# Patient Record
Sex: FEMALE | Race: WHITE | NOT HISPANIC OR LATINO | Employment: OTHER | URBAN - METROPOLITAN AREA
[De-identification: names, ages, dates, MRNs, and addresses within clinical notes are randomized per-mention and may not be internally consistent; named-entity substitution may affect disease eponyms.]

---

## 2022-04-13 ENCOUNTER — TRANSFERRED RECORDS (OUTPATIENT)
Dept: HEALTH INFORMATION MANAGEMENT | Facility: CLINIC | Age: 72
End: 2022-04-13

## 2022-05-24 ENCOUNTER — TRANSFERRED RECORDS (OUTPATIENT)
Dept: HEALTH INFORMATION MANAGEMENT | Facility: CLINIC | Age: 72
End: 2022-05-24

## 2022-06-17 ENCOUNTER — MEDICAL CORRESPONDENCE (OUTPATIENT)
Dept: HEALTH INFORMATION MANAGEMENT | Facility: CLINIC | Age: 72
End: 2022-06-17

## 2022-06-25 ENCOUNTER — TRANSCRIBE ORDERS (OUTPATIENT)
Dept: OTHER | Age: 72
End: 2022-06-25

## 2022-06-25 ENCOUNTER — TELEPHONE (OUTPATIENT)
Dept: ORTHOPEDICS | Facility: CLINIC | Age: 72
End: 2022-06-25

## 2022-06-25 DIAGNOSIS — M85.662 BONE CYST OF LEFT TIBIA: Primary | ICD-10-CM

## 2022-06-25 NOTE — TELEPHONE ENCOUNTER
[facility] Call Center    Phone Message    May a detailed message be left on voicemail: yes     Reason for Call: Appointment Intake    Referring Provider Name: Dr Tee Espinoza    Diagnosis and/or Symptoms: Consult for biopsy of left tibial cyst seen on x-rays/Ct-Scan    Action Taken: Message routed to:  [facility] Orthopedics - Dr. Tee Espinoza     Travel Screening: Not Applicable

## 2022-06-27 NOTE — TELEPHONE ENCOUNTER
Called and LVM to have pt call back.     OK to schedul into [facility] with last open spot ( RTN spot) Virtual or [facility] any time next week      Carline

## 2022-06-30 NOTE — TELEPHONE ENCOUNTER
DIAGNOSIS: consult for biopsy of left tibial cyst / X-Rays, CT-Scan / Deng Yang PA-C   APPOINTMENT DATE: 7.8.22   NOTES STATUS DETAILS   OFFICE NOTE from referring provider Received: 07/06/2022 04/13/2022 - Deng Yang PA-C @ [facility]     LABS     CBC/DIFF Care Everywhere 02/23/2022   XRAY PACS 04/13/2022 - LT Knee   CT SCAN PACS 05/24/2022 - Left Knee     Action 6.30.22 4:46 PM SHOBHA   Action Taken Faxed request to [facility] for records [phone]      Action July 6, 2022 3:43 PM MT   Action Taken CSS recvd records from [facility] and sent to urgent scan for upload. CSS recvd and resolved imgs to PACS.

## 2022-07-08 ENCOUNTER — PRE VISIT (OUTPATIENT)
Dept: ORTHOPEDICS | Facility: CLINIC | Age: 72
End: 2022-07-08

## 2022-07-08 ENCOUNTER — OFFICE VISIT (OUTPATIENT)
Dept: ORTHOPEDICS | Facility: CLINIC | Age: 72
End: 2022-07-08
Payer: COMMERCIAL

## 2022-07-08 ENCOUNTER — LAB (OUTPATIENT)
Dept: LAB | Facility: CLINIC | Age: 72
End: 2022-07-08

## 2022-07-08 VITALS — HEIGHT: 67 IN | WEIGHT: 175 LBS | BODY MASS INDEX: 27.47 KG/M2

## 2022-07-08 DIAGNOSIS — M85.662 BONE CYST OF LEFT TIBIA: ICD-10-CM

## 2022-07-08 LAB — TOTAL PROTEIN SERUM FOR ELP: 7.5 G/DL (ref 6.4–8.3)

## 2022-07-08 PROCEDURE — 84165 PROTEIN E-PHORESIS SERUM: CPT | Mod: TC | Performed by: PATHOLOGY

## 2022-07-08 PROCEDURE — 99204 OFFICE O/P NEW MOD 45 MIN: CPT | Performed by: ORTHOPAEDIC SURGERY

## 2022-07-08 PROCEDURE — 84155 ASSAY OF PROTEIN SERUM: CPT | Performed by: ORTHOPAEDIC SURGERY

## 2022-07-08 PROCEDURE — 83521 IG LIGHT CHAINS FREE EACH: CPT | Performed by: ORTHOPAEDIC SURGERY

## 2022-07-08 PROCEDURE — 84165 PROTEIN E-PHORESIS SERUM: CPT | Mod: 26

## 2022-07-08 PROCEDURE — 36415 COLL VENOUS BLD VENIPUNCTURE: CPT | Performed by: PATHOLOGY

## 2022-07-08 RX ORDER — LORAZEPAM 0.5 MG/1
TABLET ORAL
COMMUNITY
Start: 2022-06-12

## 2022-07-08 RX ORDER — ESTRADIOL 10 UG/1
10 INSERT VAGINAL
COMMUNITY
Start: 2021-06-22

## 2022-07-08 RX ORDER — AMLODIPINE BESYLATE 5 MG/1
1 TABLET ORAL 2 TIMES DAILY
COMMUNITY
Start: 2021-08-30

## 2022-07-08 RX ORDER — ALBUTEROL SULFATE 90 UG/1
AEROSOL, METERED RESPIRATORY (INHALATION)
COMMUNITY
Start: 2021-12-09

## 2022-07-08 RX ORDER — PREGABALIN 100 MG/1
200 CAPSULE ORAL DAILY
COMMUNITY
Start: 2022-06-17

## 2022-07-08 RX ORDER — NORTRIPTYLINE HCL 10 MG
CAPSULE ORAL
COMMUNITY
Start: 2022-04-14

## 2022-07-08 RX ORDER — LANSOPRAZOLE 30 MG/1
CAPSULE, DELAYED RELEASE ORAL
COMMUNITY
Start: 2022-06-03

## 2022-07-08 RX ORDER — FAMOTIDINE 20 MG
TABLET ORAL
Status: ON HOLD | COMMUNITY
End: 2023-03-27

## 2022-07-08 RX ORDER — SODIUM PHOSPHATE,MONO-DIBASIC 19G-7G/118
ENEMA (ML) RECTAL DAILY
COMMUNITY

## 2022-07-08 RX ORDER — ASPIRIN 81 MG/1
81 TABLET ORAL DAILY
Status: ON HOLD | COMMUNITY
End: 2023-03-28

## 2022-07-08 RX ORDER — DICYCLOMINE HYDROCHLORIDE 10 MG/1
10 CAPSULE ORAL
Status: ON HOLD | COMMUNITY
Start: 2022-06-30 | End: 2023-03-27

## 2022-07-08 RX ORDER — ONDANSETRON 4 MG/1
4 TABLET, ORALLY DISINTEGRATING ORAL PRN
COMMUNITY
Start: 2022-06-30

## 2022-07-08 RX ORDER — CHLORAL HYDRATE 500 MG
1 CAPSULE ORAL
COMMUNITY

## 2022-07-08 RX ORDER — ZOLPIDEM TARTRATE 10 MG/1
TABLET ORAL
Status: ON HOLD | COMMUNITY
Start: 2022-06-06 | End: 2024-08-19

## 2022-07-08 NOTE — LETTER
7/8/2022         RE: Delmis Parrish  [address] [facility] [address]        Dear Colleague,    Thank you for referring your patient, Delmis Parrish, to the [facility]. Please see a copy of my visit note below.    This patient's noticed some left knee pain which has been somewhat responsive to steroid injections.  She had an injection last December and then 1 3 months ago in April.  She stated that the most recent injection gave her relief of most of her pain for about 3 months.      As part of her evaluation for knee pain she had x-ray and then eventually a CT scan.  She is unable to get MRI because of a spine stimulator.  The scan showed a large lytic lesion in the tibial plateau area.  There is no obvious extension to the joint and no surrounding sclerosis.  We would consider metastases and myeloma although she has knee osteoarthritis and a cyst related to the arthritis is a possibility.  On those cyst we will often see some surrounding sclerosis after they have been established.  And there is also often joint connection which is not obvious with this patient.    On examination the patient is alert oriented has normal mood and affect and is in no acute distress.  She has some joint line tenderness and crepitance in her knee with a mild effusion.  She is able to extend it fully and flex past 90 degrees.  There is no erythema or adenopathy in the left lower extremity.    I have proposed open biopsy to this patient.  She is in agreement with this plan.  We will do this through interventional radiology.  An open biopsy will be performed if no diagnosis is obtained.  I have answered all the patient's questions.    Sincerely,        Tee Espinoza MD

## 2022-07-08 NOTE — PROGRESS NOTES
This patient's noticed some left knee pain which has been somewhat responsive to steroid injections.  She had an injection last December and then 1 3 months ago in April.  She stated that the most recent injection gave her relief of most of her pain for about 3 months.      As part of her evaluation for knee pain she had x-ray and then eventually a CT scan.  She is unable to get MRI because of a spine stimulator.  The scan showed a large lytic lesion in the tibial plateau area.  There is no obvious extension to the joint and no surrounding sclerosis.  We would consider metastases and myeloma although she has knee osteoarthritis and a cyst related to the arthritis is a possibility.  On those cyst we will often see some surrounding sclerosis after they have been established.  And there is also often joint connection which is not obvious with this patient.    On examination the patient is alert oriented has normal mood and affect and is in no acute distress.  She has some joint line tenderness and crepitance in her knee with a mild effusion.  She is able to extend it fully and flex past 90 degrees.  There is no erythema or adenopathy in the left lower extremity.    I have proposed open biopsy to this patient.  She is in agreement with this plan.  We will do this through interventional radiology.  An open biopsy will be performed if no diagnosis is obtained.  I have answered all the patient's questions.

## 2022-07-08 NOTE — NURSING NOTE
"Reason For Visit:   Chief Complaint   Patient presents with     Consult     Left knee cyst referred by Deng Yang PA-C at [facility] // noticed first pain in December 2021       Ht 1.71 m (5' 7.32\")   Wt 79.4 kg (175 lb)   BMI 27.15 kg/m      Pain Assessment  Patient Currently in Pain: Yes  0-10 Pain Scale: 4  Primary Pain Location: Knee (left knee)      Rafael Rodriguez ATC    "

## 2022-07-11 ENCOUNTER — TELEPHONE (OUTPATIENT)
Dept: INTERVENTIONAL RADIOLOGY/VASCULAR | Facility: CLINIC | Age: 72
End: 2022-07-11

## 2022-07-11 DIAGNOSIS — M25.561 RIGHT KNEE PAIN, UNSPECIFIED CHRONICITY: Primary | ICD-10-CM

## 2022-07-11 DIAGNOSIS — M25.562 LEFT KNEE PAIN, UNSPECIFIED CHRONICITY: ICD-10-CM

## 2022-07-11 LAB
KAPPA LC FREE SER-MCNC: 2.35 MG/DL (ref 0.33–1.94)
KAPPA LC FREE/LAMBDA FREE SER NEPH: 0.87 {RATIO} (ref 0.26–1.65)
LAMBDA LC FREE SERPL-MCNC: 2.7 MG/DL (ref 0.57–2.63)

## 2022-07-11 NOTE — PROGRESS NOTES
Outpatient IR Biopsy Referral    Patient is a 70 y/o female with a PMH of GERD, ETOH abuse in remission, spinal cord stimulator, depression, LVH, lumbar spondylosis, diverticulosis, plantar facial fibromatosis, HTN  left knee pain s/p steroid injections with some relief, last injection April 2022?. Imaging CT 5/24/22 notes a 3.6 x 3.0 x 2.2 cm lucent lesion within the lateral aspect of the proximal tibial metaphysis.  IR has been asked to biopsy a large tibial plateau lesion for concerns of myeloma.        Case and imaging CT 5/24/22 was reviewed with Dr. Lopez from IR and CT guided biopsy of LEFT proximal tibia lesion  is approved. Dr. Lopez recommends a lateral approach Series 4  Image 119.    Procedure order, surgical pathology and flow orders placed.    ASA should be held for 5 days prior to scheduled procedure.     If requesting team would like samples sent for anything else please enter them or notify IR prior to scheduled procedure.    Primary team Dr. Alexis Rodarte made aware of IR recommendations via epic messaging.     KRYSTAL Jensen CNP  Interventional Radiology   IR on-call pager: [phone]

## 2022-07-12 ENCOUNTER — MYC MEDICAL ADVICE (OUTPATIENT)
Dept: INTERVENTIONAL RADIOLOGY/VASCULAR | Facility: CLINIC | Age: 72
End: 2022-07-12

## 2022-07-12 ENCOUNTER — TELEPHONE (OUTPATIENT)
Dept: INTERVENTIONAL RADIOLOGY/VASCULAR | Facility: CLINIC | Age: 72
End: 2022-07-12

## 2022-07-12 LAB
ALBUMIN SERPL ELPH-MCNC: 4.6 G/DL (ref 3.7–5.1)
ALPHA1 GLOB SERPL ELPH-MCNC: 0.3 G/DL (ref 0.2–0.4)
ALPHA2 GLOB SERPL ELPH-MCNC: 0.6 G/DL (ref 0.5–0.9)
B-GLOBULIN SERPL ELPH-MCNC: 0.9 G/DL (ref 0.6–1)
GAMMA GLOB SERPL ELPH-MCNC: 1.1 G/DL (ref 0.7–1.6)
M PROTEIN SERPL ELPH-MCNC: 0.2 G/DL
PROT PATTERN SERPL ELPH-IMP: ABNORMAL

## 2022-07-15 ENCOUNTER — HOSPITAL ENCOUNTER (OUTPATIENT)
Facility: CLINIC | Age: 72
Discharge: HOME | End: 2022-07-15
Attending: ORTHOPAEDIC SURGERY | Admitting: RADIOLOGY
Payer: COMMERCIAL

## 2022-07-15 ENCOUNTER — APPOINTMENT (OUTPATIENT)
Dept: MEDSURG UNIT | Facility: CLINIC | Age: 72
End: 2022-07-15
Attending: ORTHOPAEDIC SURGERY
Payer: COMMERCIAL

## 2022-07-15 ENCOUNTER — APPOINTMENT (OUTPATIENT)
Dept: INTERVENTIONAL RADIOLOGY/VASCULAR | Facility: CLINIC | Age: 72
End: 2022-07-15
Attending: ORTHOPAEDIC SURGERY
Payer: COMMERCIAL

## 2022-07-15 VITALS
RESPIRATION RATE: 12 BRPM | OXYGEN SATURATION: 88 % | HEART RATE: 67 BPM | BODY MASS INDEX: 26.07 KG/M2 | HEIGHT: 68 IN | DIASTOLIC BLOOD PRESSURE: 73 MMHG | WEIGHT: 172 LBS | SYSTOLIC BLOOD PRESSURE: 105 MMHG | TEMPERATURE: 98 F

## 2022-07-15 DIAGNOSIS — M25.562 LEFT KNEE PAIN, UNSPECIFIED CHRONICITY: ICD-10-CM

## 2022-07-15 DIAGNOSIS — M25.561 RIGHT KNEE PAIN, UNSPECIFIED CHRONICITY: ICD-10-CM

## 2022-07-15 LAB
ERYTHROCYTE [DISTWIDTH] IN BLOOD BY AUTOMATED COUNT: 12.9 % (ref 10–15)
HCT VFR BLD AUTO: 42.8 % (ref 35–47)
HGB BLD-MCNC: 14.1 G/DL (ref 11.7–15.7)
INR PPP: 1.01 (ref 0.85–1.15)
MCH RBC QN AUTO: 29.7 PG (ref 26.5–33)
MCHC RBC AUTO-ENTMCNC: 32.9 G/DL (ref 31.5–36.5)
MCV RBC AUTO: 90 FL (ref 78–100)
PLATELET # BLD AUTO: 267 10E3/UL (ref 150–450)
RBC # BLD AUTO: 4.74 10E6/UL (ref 3.8–5.2)
WBC # BLD AUTO: 7.5 10E3/UL (ref 4–11)

## 2022-07-15 PROCEDURE — 999N000054 HC STATISTIC EKG NON-CHARGEABLE

## 2022-07-15 PROCEDURE — 77012 CT SCAN FOR NEEDLE BIOPSY: CPT | Mod: 26 | Performed by: RADIOLOGY

## 2022-07-15 PROCEDURE — 20225 BONE BIOPSY TROCAR/NDL DEEP: CPT

## 2022-07-15 PROCEDURE — 99152 MOD SED SAME PHYS/QHP 5/>YRS: CPT

## 2022-07-15 PROCEDURE — 88185 FLOWCYTOMETRY/TC ADD-ON: CPT | Performed by: ORTHOPAEDIC SURGERY

## 2022-07-15 PROCEDURE — 93010 ELECTROCARDIOGRAM REPORT: CPT | Mod: 59 | Performed by: INTERNAL MEDICINE

## 2022-07-15 PROCEDURE — 250N000011 HC RX IP 250 OP 636: Performed by: STUDENT IN AN ORGANIZED HEALTH CARE EDUCATION/TRAINING PROGRAM

## 2022-07-15 PROCEDURE — 85610 PROTHROMBIN TIME: CPT | Performed by: NURSE PRACTITIONER

## 2022-07-15 PROCEDURE — 272N000509 HC NEEDLE CR9

## 2022-07-15 PROCEDURE — 99152 MOD SED SAME PHYS/QHP 5/>YRS: CPT | Performed by: RADIOLOGY

## 2022-07-15 PROCEDURE — 999N000142 HC STATISTIC PROCEDURE PREP ONLY

## 2022-07-15 PROCEDURE — 36415 COLL VENOUS BLD VENIPUNCTURE: CPT | Performed by: NURSE PRACTITIONER

## 2022-07-15 PROCEDURE — 85027 COMPLETE CBC AUTOMATED: CPT | Performed by: NURSE PRACTITIONER

## 2022-07-15 PROCEDURE — 272N000505 HC NEEDLE CR5

## 2022-07-15 PROCEDURE — 250N000009 HC RX 250: Performed by: STUDENT IN AN ORGANIZED HEALTH CARE EDUCATION/TRAINING PROGRAM

## 2022-07-15 PROCEDURE — 20225 BONE BIOPSY TROCAR/NDL DEEP: CPT | Performed by: RADIOLOGY

## 2022-07-15 PROCEDURE — 93005 ELECTROCARDIOGRAM TRACING: CPT

## 2022-07-15 PROCEDURE — 272N000155 HC KIT CR15

## 2022-07-15 PROCEDURE — 88311 DECALCIFY TISSUE: CPT | Mod: TC | Performed by: ORTHOPAEDIC SURGERY

## 2022-07-15 PROCEDURE — 999N000133 HC STATISTIC PP CARE STAGE 2

## 2022-07-15 PROCEDURE — 88188 FLOWCYTOMETRY/READ 9-15: CPT | Performed by: STUDENT IN AN ORGANIZED HEALTH CARE EDUCATION/TRAINING PROGRAM

## 2022-07-15 RX ORDER — NALOXONE HYDROCHLORIDE 0.4 MG/ML
0.4 INJECTION, SOLUTION INTRAMUSCULAR; INTRAVENOUS; SUBCUTANEOUS
Status: DISCONTINUED | OUTPATIENT
Start: 2022-07-15 | End: 2022-07-15 | Stop reason: HOSPADM

## 2022-07-15 RX ORDER — NALOXONE HYDROCHLORIDE 0.4 MG/ML
0.2 INJECTION, SOLUTION INTRAMUSCULAR; INTRAVENOUS; SUBCUTANEOUS
Status: DISCONTINUED | OUTPATIENT
Start: 2022-07-15 | End: 2022-07-15 | Stop reason: HOSPADM

## 2022-07-15 RX ORDER — FENTANYL CITRATE 50 UG/ML
25-50 INJECTION, SOLUTION INTRAMUSCULAR; INTRAVENOUS EVERY 5 MIN PRN
Status: DISCONTINUED | OUTPATIENT
Start: 2022-07-15 | End: 2022-07-15 | Stop reason: HOSPADM

## 2022-07-15 RX ORDER — FLUMAZENIL 0.1 MG/ML
0.2 INJECTION, SOLUTION INTRAVENOUS
Status: DISCONTINUED | OUTPATIENT
Start: 2022-07-15 | End: 2022-07-15 | Stop reason: HOSPADM

## 2022-07-15 RX ORDER — SODIUM CHLORIDE 9 MG/ML
INJECTION, SOLUTION INTRAVENOUS CONTINUOUS
Status: DISCONTINUED | OUTPATIENT
Start: 2022-07-15 | End: 2022-07-15 | Stop reason: HOSPADM

## 2022-07-15 RX ORDER — LIDOCAINE 40 MG/G
CREAM TOPICAL
Status: DISCONTINUED | OUTPATIENT
Start: 2022-07-15 | End: 2022-07-15 | Stop reason: HOSPADM

## 2022-07-15 RX ADMIN — MIDAZOLAM HYDROCHLORIDE 0.5 MG: 1 INJECTION, SOLUTION INTRAMUSCULAR; INTRAVENOUS at 11:22

## 2022-07-15 RX ADMIN — FENTANYL CITRATE 25 MCG: 50 INJECTION, SOLUTION INTRAMUSCULAR; INTRAVENOUS at 11:13

## 2022-07-15 RX ADMIN — MIDAZOLAM HYDROCHLORIDE 0.5 MG: 1 INJECTION, SOLUTION INTRAMUSCULAR; INTRAVENOUS at 11:14

## 2022-07-15 RX ADMIN — FENTANYL CITRATE 25 MCG: 50 INJECTION, SOLUTION INTRAMUSCULAR; INTRAVENOUS at 11:18

## 2022-07-15 RX ADMIN — MIDAZOLAM HYDROCHLORIDE 0.5 MG: 1 INJECTION, SOLUTION INTRAMUSCULAR; INTRAVENOUS at 11:30

## 2022-07-15 RX ADMIN — LIDOCAINE HYDROCHLORIDE 6 ML: 10 INJECTION, SOLUTION EPIDURAL; INFILTRATION; INTRACAUDAL; PERINEURAL at 11:41

## 2022-07-15 RX ADMIN — FENTANYL CITRATE 25 MCG: 50 INJECTION, SOLUTION INTRAMUSCULAR; INTRAVENOUS at 11:21

## 2022-07-15 RX ADMIN — MIDAZOLAM HYDROCHLORIDE 0.5 MG: 1 INJECTION, SOLUTION INTRAMUSCULAR; INTRAVENOUS at 11:18

## 2022-07-15 RX ADMIN — FENTANYL CITRATE 25 MCG: 50 INJECTION, SOLUTION INTRAMUSCULAR; INTRAVENOUS at 11:30

## 2022-07-15 NOTE — PROGRESS NOTES
PIV removed. Patient tolerating PO intake, ambulated to bathroom, voiding spontaneously. LLE biopsy site with primcarmen, CDI Discharge paperwork reviewed with patient and , educated patient on activity restrictions and when to seek medical attention pt stated understanding.  Chris will transport patient home.

## 2022-07-15 NOTE — DISCHARGE INSTRUCTIONS
[facility]    Interventional Radiology  Patient Instructions Following Biopsy    AFTER YOU GO HOME  If you were given sedation DO NOT drive or operate machinery at home or at work for at least 24 hours  DO relax and take it easy for 48 hours, no strenuous activity for 24 hours  DO drink plenty of fluids  DO resume your regular diet, unless otherwise instructed by your Primary Physician  Keep the dressing dry and in place for 24 hours.  DO NOT SMOKE FOR AT LEAST 24 HOURS, if you have been given any medications that were to help you relax or sedate you during your procedure  DO NOT drink alcoholic beverages the day of your procedure  DO NOT do any strenuous exercise or lifting (> 10 lbs) for at least 7 days following your procedure  DO NOT take a bath or shower for at least 12 hours following your procedure  Remove dressing after shower the next day. Replace with Band aid for 2 days.  Never leave a wet dressing in place.  DO NOT make any important or legal decisions for 24 hours following your procedure  There should be minimum drainage from the biopsy site    CALL THE PHYSICIAN IF:  You start bleeding from the procedure site.  If you do start to bleed from that site, lie down flat and hold pressure on the site for a minimum of 10 minutes.  Your physician will tell you if you need to return to the hospital  You develop nausea or vomiting  You have excessive swelling, redness, or tenderness at the site  You have drainage that looks like it is infected.  You experience severe pain  You develop hives or a rash or unexplained itching  You develop shortness of breath  You develop a temperature of 101 degrees F or greater  You develop bloody clots or red urine after you are discharged  You develop chest pain or cough up blood, lightheadedness or fainting    ADDITIONAL INSTRUCTIONS  If you are taking Coumadin, restart tonight.  Follow up with your Coumadin Clinic or Primary Care MD to have your INR  rechecked.    [facility] INTERVENTIONAL RADIOLOGY DEPARTMENT  Procedure Physician:          Jose Daniel Esparaz  Date of procedure: July 15, 2022  Telephone Numbers: [phone] Monday-Friday 8:00 am to 4:30 pm  [phone] After 4:30 pm Monday-Friday, Weekends & Holidays.   Ask for the Interventional Radiologist on call.  Someone is on call 24 hrs/day  [facility] toll free number: [phone] Monday-Friday 8:00 am to 4:30 pm  [facility] Emergency Dept: [phone]

## 2022-07-15 NOTE — PRE-PROCEDURE
GENERAL PRE-PROCEDURE:   Procedure:  CT guided bone  biopsy  Date/Time:  7/15/2022 8:34 AM    Verbal consent obtained?: Yes    Risks and benefits: Risks, benefits and alternatives were discussed    Consent given by:  Patient  Patient states understanding of procedure being performed: Yes    Patient's understanding of procedure matches consent: Yes    Procedure consent matches procedure scheduled: Yes    Expected level of sedation:  Moderate  Appropriately NPO:  Yes  ASA Class:  2  Mallampati  :  Grade 2- soft palate, base of uvula, tonsillar pillars, and portion of posterior pharyngeal wall visible  Lungs:  Lungs clear with good breath sounds bilaterally  Heart:  Normal heart sounds and rate  History & Physical reviewed:  History and physical reviewed and no updates needed  Statement of review:  I have reviewed the lab findings, diagnostic data, medications, and the plan for sedation

## 2022-07-15 NOTE — IR NOTE
Patient Name: Delmis Parrish  Medical Record Number: [id number]  Today's Date: 7/15/2022    Procedure: Image Guided Biopsy of Left Tibial Mass/Lesion.  Proceduralist: Dr. Esparza  Pathology present: No    Procedure Start: 1114  Procedure end: 1133  Sedation medications administered: Midazolam 2 mg, Fentanyl 100 mcg       Report given to: 2A RN  : NA    Other Notes: Pt arrived to IR room CT-2 from . Consent reviewed. Pt denies any questions or concerns regarding procedure. Pt positioned supine and monitored per protocol. Pt tolerated procedure without any noted complications. Per Dr. Esparza, 2 cores placed in Formalin and 1 soft tissue core placed in RPMI with bloody fluid. Pt transferred back to .

## 2022-07-15 NOTE — PROCEDURES
[facility]    Procedure: IR Procedure Note    Date/Time: 7/15/2022 11:49 AM  Performed by: Jose Daniel Esparza MD  Authorized by: Jose Daniel Esparza MD       UNIVERSAL PROTOCOL   Site Marked: Yes  Prior Images Obtained and Reviewed:  Yes  Required items: Required blood products, implants, devices and special equipment available    Patient identity confirmed:  Verbally with patient, arm band, provided demographic data and hospital-assigned identification number  Patient was reevaluated immediately before administering moderate or deep sedation or anesthesia  Confirmation Checklist:  Patient's identity using two indicators, relevant allergies, procedure was appropriate and matched the consent or emergent situation and correct equipment/implants were available  Time out: Immediately prior to the procedure a time out was called    Universal Protocol: the Joint Commission Universal Protocol was followed    Preparation: Patient was prepped and draped in usual sterile fashion    ESBL (mL):  2     ANESTHESIA    Anesthesia: Local infiltration  Local Anesthetic:  Lidocaine 1% without epinephrine  Anesthetic Total (mL):  8      SEDATION  Patient Sedated: Yes    Sedation Type:  Moderate (conscious) sedation  Sedation:  Fentanyl and midazolam  Vital signs: Vital signs monitored during sedation    See dictated procedure note for full details.  Findings: Left tibial cystic lesion biopsied.    Single 15 gauge core taken from the bone and submitted in formalin.    Three passes made with a 18 gauge biopsy needle through the access cannula. 1 sparse core submitted in RPMI. 2 sparse cores submitted in formalin.    No immediate complication.     SEDATION TIME WAS 17 minutes.    Specimens: none    Complications: None    Condition: Stable      PROCEDURE    Patient Tolerance:  Patient tolerated the procedure well with no immediate complications  Length of time physician/provider present for  1:1 monitoring during sedation: 15

## 2022-07-15 NOTE — PROGRESS NOTES
Patient arrived to room via stretcher with Bbaak RN s/p Left tibial cystic lesion  . VSS. Denies/report pain. Pt alert and oriented x4. LLE site CDI.

## 2022-07-16 LAB
ATRIAL RATE - MUSE: 197 BPM
DIASTOLIC BLOOD PRESSURE - MUSE: NORMAL MMHG
INTERPRETATION ECG - MUSE: NORMAL
P AXIS - MUSE: NORMAL DEGREES
PR INTERVAL - MUSE: NORMAL MS
QRS DURATION - MUSE: 84 MS
QT - MUSE: 376 MS
QTC - MUSE: 425 MS
R AXIS - MUSE: -18 DEGREES
SYSTOLIC BLOOD PRESSURE - MUSE: NORMAL MMHG
T AXIS - MUSE: 1 DEGREES
VENTRICULAR RATE- MUSE: 77 BPM

## 2022-07-18 LAB

## 2022-07-18 PROCEDURE — 88311 DECALCIFY TISSUE: CPT | Mod: 26 | Performed by: PATHOLOGY

## 2022-07-18 PROCEDURE — 88307 TISSUE EXAM BY PATHOLOGIST: CPT | Mod: 26 | Performed by: PATHOLOGY

## 2022-07-20 ENCOUNTER — TELEPHONE (OUTPATIENT)
Dept: INTERVENTIONAL RADIOLOGY/VASCULAR | Facility: CLINIC | Age: 72
End: 2022-07-20

## 2022-07-20 NOTE — TELEPHONE ENCOUNTER
Call attempt: 1  Message left: Yes  IR nurse triage line number provided: Yes     Left VM message with request to contact IR with any concerns or questions post procedure.    Post call completed.   July 20, 2022 9:47 AM  Shirin Bethea RN

## 2022-07-21 ENCOUNTER — TELEPHONE (OUTPATIENT)
Dept: ORTHOPEDICS | Facility: CLINIC | Age: 72
End: 2022-07-21

## 2022-07-21 NOTE — TELEPHONE ENCOUNTER
[facility] Call Center    Phone Message    May a detailed message be left on voicemail: yes     Reason for Call: Other: Pt had procedure done on 07/16 and was calling for next steps she has nothing scheduled and didn't know if someone was going to call her with the results although she can see the results on mychart     Action Taken: Other: ortho    Travel Screening: Not Applicable

## 2022-07-21 NOTE — TELEPHONE ENCOUNTER
Spoke with the patient that I want to clarify the diagnosis with Dr. Espinoza and see if he does want an open biopsy or if he would proceed with treatment.  I mentioned to the patient we will get back to her early next week with the final plan.  She is understanding of this and will wait to hear back from us.

## 2022-07-26 NOTE — TELEPHONE ENCOUNTER
[facility] Call Center    Phone Message    May a detailed message be left on voicemail: yes     Reason for Call: Other: patient says she is still awaiting the call to set up biopsy -- per Catherine note it says early this week but the patient says she specifically told her Monday she would get a call -     Action Taken: Message routed to:  [facility] ortho    Travel Screening: Not Applicable     Would like a call back to further discuss setting this up

## 2022-07-27 DIAGNOSIS — M89.9 BONE LESION: Primary | ICD-10-CM

## 2022-07-28 ENCOUNTER — TELEPHONE (OUTPATIENT)
Dept: ORTHOPEDICS | Facility: CLINIC | Age: 72
End: 2022-07-28

## 2022-07-28 NOTE — TELEPHONE ENCOUNTER
Phoned patient to schedule her for surgery with Dr. Espinoza.     Patient was unavailable, provided call back number in voicemail: [phone].

## 2022-07-28 NOTE — TELEPHONE ENCOUNTER
Patient is scheduled for surgery with Dr. Espinoza    Spoke with: Delmis    Date of Surgery: 8/08/22     Location: [facility] OR    Informed patient they will need an adult  Yes    H&P: Scheduled with PCP    Pre-procedure COVID-19 Test: Patient will complete    Additional imaging/appointments: POP made    Surgery packet: Received     Additional comments: N/A

## 2022-08-04 RX ORDER — FLUOXETINE 10 MG/1
15 TABLET, FILM COATED ORAL AT BEDTIME
COMMUNITY

## 2022-08-06 ENCOUNTER — ANESTHESIA EVENT (OUTPATIENT)
Dept: SURGERY | Facility: CLINIC | Age: 72
End: 2022-08-06
Payer: COMMERCIAL

## 2022-08-08 ENCOUNTER — APPOINTMENT (OUTPATIENT)
Dept: GENERAL RADIOLOGY | Facility: CLINIC | Age: 72
End: 2022-08-08
Attending: PHYSICIAN ASSISTANT
Payer: COMMERCIAL

## 2022-08-08 ENCOUNTER — ANESTHESIA (OUTPATIENT)
Dept: SURGERY | Facility: CLINIC | Age: 72
End: 2022-08-08
Payer: COMMERCIAL

## 2022-08-08 ENCOUNTER — HOSPITAL ENCOUNTER (OUTPATIENT)
Facility: CLINIC | Age: 72
Discharge: HOME | End: 2022-08-08
Attending: ORTHOPAEDIC SURGERY | Admitting: ORTHOPAEDIC SURGERY
Payer: COMMERCIAL

## 2022-08-08 VITALS
TEMPERATURE: 97 F | RESPIRATION RATE: 12 BRPM | BODY MASS INDEX: 26.7 KG/M2 | DIASTOLIC BLOOD PRESSURE: 75 MMHG | WEIGHT: 176.15 LBS | HEART RATE: 65 BPM | SYSTOLIC BLOOD PRESSURE: 116 MMHG | HEIGHT: 68 IN | OXYGEN SATURATION: 94 %

## 2022-08-08 DIAGNOSIS — M89.9 BONE LESION: Primary | ICD-10-CM

## 2022-08-08 LAB — GLUCOSE BLDC GLUCOMTR-MCNC: 98 MG/DL (ref 70–99)

## 2022-08-08 PROCEDURE — C1713 ANCHOR/SCREW BN/BN,TIS/BN: HCPCS | Performed by: ORTHOPAEDIC SURGERY

## 2022-08-08 PROCEDURE — 250N000009 HC RX 250: Performed by: ORTHOPAEDIC SURGERY

## 2022-08-08 PROCEDURE — 88311 DECALCIFY TISSUE: CPT | Mod: TC | Performed by: ORTHOPAEDIC SURGERY

## 2022-08-08 PROCEDURE — 250N000011 HC RX IP 250 OP 636: Performed by: NURSE ANESTHETIST, CERTIFIED REGISTERED

## 2022-08-08 PROCEDURE — 27638 REMOVE/GRAFT LEG BONE LESION: CPT | Mod: LT | Performed by: ORTHOPAEDIC SURGERY

## 2022-08-08 PROCEDURE — 370N000017 HC ANESTHESIA TECHNICAL FEE, PER MIN: Performed by: ORTHOPAEDIC SURGERY

## 2022-08-08 PROCEDURE — 360N000077 HC SURGERY LEVEL 4, PER MIN: Performed by: ORTHOPAEDIC SURGERY

## 2022-08-08 PROCEDURE — 250N000009 HC RX 250: Performed by: ANESTHESIOLOGY

## 2022-08-08 PROCEDURE — 82962 GLUCOSE BLOOD TEST: CPT

## 2022-08-08 PROCEDURE — 250N000011 HC RX IP 250 OP 636: Performed by: ORTHOPAEDIC SURGERY

## 2022-08-08 PROCEDURE — 73560 X-RAY EXAM OF KNEE 1 OR 2: CPT | Mod: 26 | Performed by: RADIOLOGY

## 2022-08-08 PROCEDURE — 999N000141 HC STATISTIC PRE-PROCEDURE NURSING ASSESSMENT: Performed by: ORTHOPAEDIC SURGERY

## 2022-08-08 PROCEDURE — 999N000065 XR KNEE LEFT 1/2 VIEWS: Mod: LT

## 2022-08-08 PROCEDURE — 250N000011 HC RX IP 250 OP 636: Performed by: PHYSICIAN ASSISTANT

## 2022-08-08 PROCEDURE — 272N000001 HC OR GENERAL SUPPLY STERILE: Performed by: ORTHOPAEDIC SURGERY

## 2022-08-08 PROCEDURE — 250N000009 HC RX 250: Performed by: NURSE ANESTHETIST, CERTIFIED REGISTERED

## 2022-08-08 PROCEDURE — 250N000013 HC RX MED GY IP 250 OP 250 PS 637: Performed by: ANESTHESIOLOGY

## 2022-08-08 PROCEDURE — 710N000012 HC RECOVERY PHASE 2, PER MINUTE: Performed by: ORTHOPAEDIC SURGERY

## 2022-08-08 PROCEDURE — 250N000025 HC SEVOFLURANE, PER MIN: Performed by: ORTHOPAEDIC SURGERY

## 2022-08-08 PROCEDURE — C1762 CONN TISS, HUMAN(INC FASCIA): HCPCS | Performed by: ORTHOPAEDIC SURGERY

## 2022-08-08 PROCEDURE — 258N000003 HC RX IP 258 OP 636: Performed by: NURSE ANESTHETIST, CERTIFIED REGISTERED

## 2022-08-08 PROCEDURE — 710N000010 HC RECOVERY PHASE 1, LEVEL 2, PER MIN: Performed by: ORTHOPAEDIC SURGERY

## 2022-08-08 PROCEDURE — 250N000011 HC RX IP 250 OP 636: Performed by: ANESTHESIOLOGY

## 2022-08-08 DEVICE — INJECTABLE HA BONE CEMENT
Type: IMPLANTABLE DEVICE | Site: TIBIA | Status: FUNCTIONAL
Brand: HYDROSET XT

## 2022-08-08 DEVICE — GRAFT BN CANC 15CC CRSH 1-10MM 800103: Type: IMPLANTABLE DEVICE | Site: TIBIA | Status: FUNCTIONAL

## 2022-08-08 RX ORDER — CEFAZOLIN SODIUM/WATER 2 G/20 ML
2 SYRINGE (ML) INTRAVENOUS SEE ADMIN INSTRUCTIONS
Status: DISCONTINUED | OUTPATIENT
Start: 2022-08-08 | End: 2022-08-08 | Stop reason: HOSPADM

## 2022-08-08 RX ORDER — FLUMAZENIL 0.1 MG/ML
0.2 INJECTION, SOLUTION INTRAVENOUS
Status: DISCONTINUED | OUTPATIENT
Start: 2022-08-08 | End: 2022-08-08 | Stop reason: HOSPADM

## 2022-08-08 RX ORDER — ONDANSETRON 4 MG/1
4 TABLET, ORALLY DISINTEGRATING ORAL EVERY 30 MIN PRN
Status: DISCONTINUED | OUTPATIENT
Start: 2022-08-08 | End: 2022-08-08 | Stop reason: HOSPADM

## 2022-08-08 RX ORDER — SODIUM CHLORIDE, SODIUM LACTATE, POTASSIUM CHLORIDE, CALCIUM CHLORIDE 600; 310; 30; 20 MG/100ML; MG/100ML; MG/100ML; MG/100ML
INJECTION, SOLUTION INTRAVENOUS CONTINUOUS
Status: DISCONTINUED | OUTPATIENT
Start: 2022-08-08 | End: 2022-08-08 | Stop reason: HOSPADM

## 2022-08-08 RX ORDER — OXYCODONE HYDROCHLORIDE 5 MG/1
5 TABLET ORAL EVERY 4 HOURS PRN
Status: DISCONTINUED | OUTPATIENT
Start: 2022-08-08 | End: 2022-08-08 | Stop reason: HOSPADM

## 2022-08-08 RX ORDER — ACETAMINOPHEN 325 MG/1
650 TABLET ORAL EVERY 4 HOURS PRN
Qty: 50 TABLET | Refills: 0 | Status: ON HOLD | OUTPATIENT
Start: 2022-08-08 | End: 2023-03-28

## 2022-08-08 RX ORDER — DEXAMETHASONE SODIUM PHOSPHATE 4 MG/ML
INJECTION, SOLUTION INTRA-ARTICULAR; INTRALESIONAL; INTRAMUSCULAR; INTRAVENOUS; SOFT TISSUE PRN
Status: DISCONTINUED | OUTPATIENT
Start: 2022-08-08 | End: 2022-08-08

## 2022-08-08 RX ORDER — OXYCODONE HYDROCHLORIDE 5 MG/1
5-10 TABLET ORAL EVERY 4 HOURS PRN
Qty: 20 TABLET | Refills: 0 | Status: ON HOLD | OUTPATIENT
Start: 2022-08-08 | End: 2023-03-28

## 2022-08-08 RX ORDER — FENTANYL CITRATE-0.9 % NACL/PF 10 MCG/ML
PLASTIC BAG, INJECTION (ML) INTRAVENOUS PRN
Status: DISCONTINUED | OUTPATIENT
Start: 2022-08-08 | End: 2022-08-08

## 2022-08-08 RX ORDER — BUPIVACAINE HYDROCHLORIDE 2.5 MG/ML
INJECTION, SOLUTION EPIDURAL; INFILTRATION; INTRACAUDAL
Status: COMPLETED | OUTPATIENT
Start: 2022-08-08 | End: 2022-08-08

## 2022-08-08 RX ORDER — ACETAMINOPHEN 325 MG/1
650 TABLET ORAL
Status: DISCONTINUED | OUTPATIENT
Start: 2022-08-08 | End: 2022-08-08 | Stop reason: HOSPADM

## 2022-08-08 RX ORDER — DEXMEDETOMIDINE HYDROCHLORIDE 4 UG/ML
INJECTION, SOLUTION INTRAVENOUS
Status: COMPLETED | OUTPATIENT
Start: 2022-08-08 | End: 2022-08-08

## 2022-08-08 RX ORDER — ONDANSETRON 2 MG/ML
INJECTION INTRAMUSCULAR; INTRAVENOUS PRN
Status: DISCONTINUED | OUTPATIENT
Start: 2022-08-08 | End: 2022-08-08

## 2022-08-08 RX ORDER — FENTANYL CITRATE 50 UG/ML
25 INJECTION, SOLUTION INTRAMUSCULAR; INTRAVENOUS EVERY 5 MIN PRN
Status: DISCONTINUED | OUTPATIENT
Start: 2022-08-08 | End: 2022-08-08 | Stop reason: HOSPADM

## 2022-08-08 RX ORDER — HYDROMORPHONE HYDROCHLORIDE 1 MG/ML
0.2 INJECTION, SOLUTION INTRAMUSCULAR; INTRAVENOUS; SUBCUTANEOUS EVERY 5 MIN PRN
Status: DISCONTINUED | OUTPATIENT
Start: 2022-08-08 | End: 2022-08-08 | Stop reason: HOSPADM

## 2022-08-08 RX ORDER — FENTANYL CITRATE 50 UG/ML
25-50 INJECTION, SOLUTION INTRAMUSCULAR; INTRAVENOUS
Status: DISCONTINUED | OUTPATIENT
Start: 2022-08-08 | End: 2022-08-08

## 2022-08-08 RX ORDER — DEXAMETHASONE SODIUM PHOSPHATE 10 MG/ML
INJECTION, SOLUTION INTRAMUSCULAR; INTRAVENOUS
Status: COMPLETED | OUTPATIENT
Start: 2022-08-08 | End: 2022-08-08

## 2022-08-08 RX ORDER — SODIUM CHLORIDE, SODIUM LACTATE, POTASSIUM CHLORIDE, CALCIUM CHLORIDE 600; 310; 30; 20 MG/100ML; MG/100ML; MG/100ML; MG/100ML
INJECTION, SOLUTION INTRAVENOUS CONTINUOUS PRN
Status: DISCONTINUED | OUTPATIENT
Start: 2022-08-08 | End: 2022-08-08

## 2022-08-08 RX ORDER — ACETAMINOPHEN 325 MG/1
975 TABLET ORAL ONCE
Status: DISCONTINUED | OUTPATIENT
Start: 2022-08-08 | End: 2022-08-08 | Stop reason: HOSPADM

## 2022-08-08 RX ORDER — MAGNESIUM HYDROXIDE 1200 MG/15ML
LIQUID ORAL PRN
Status: DISCONTINUED | OUTPATIENT
Start: 2022-08-08 | End: 2022-08-08 | Stop reason: HOSPADM

## 2022-08-08 RX ORDER — NALOXONE HYDROCHLORIDE 0.4 MG/ML
0.2 INJECTION, SOLUTION INTRAMUSCULAR; INTRAVENOUS; SUBCUTANEOUS
Status: DISCONTINUED | OUTPATIENT
Start: 2022-08-08 | End: 2022-08-08 | Stop reason: HOSPADM

## 2022-08-08 RX ORDER — AMOXICILLIN 250 MG
1-2 CAPSULE ORAL 2 TIMES DAILY
Qty: 30 TABLET | Refills: 0 | Status: ON HOLD | OUTPATIENT
Start: 2022-08-08 | End: 2023-03-28

## 2022-08-08 RX ORDER — PROPOFOL 10 MG/ML
INJECTION, EMULSION INTRAVENOUS PRN
Status: DISCONTINUED | OUTPATIENT
Start: 2022-08-08 | End: 2022-08-08

## 2022-08-08 RX ORDER — MEPERIDINE HYDROCHLORIDE 25 MG/ML
12.5 INJECTION INTRAMUSCULAR; INTRAVENOUS; SUBCUTANEOUS
Status: DISCONTINUED | OUTPATIENT
Start: 2022-08-08 | End: 2022-08-08 | Stop reason: HOSPADM

## 2022-08-08 RX ORDER — CEFAZOLIN SODIUM/WATER 2 G/20 ML
2 SYRINGE (ML) INTRAVENOUS
Status: COMPLETED | OUTPATIENT
Start: 2022-08-08 | End: 2022-08-08

## 2022-08-08 RX ORDER — LIDOCAINE 40 MG/G
CREAM TOPICAL
Status: DISCONTINUED | OUTPATIENT
Start: 2022-08-08 | End: 2022-08-08 | Stop reason: HOSPADM

## 2022-08-08 RX ORDER — FENTANYL CITRATE 50 UG/ML
25 INJECTION, SOLUTION INTRAMUSCULAR; INTRAVENOUS
Status: DISCONTINUED | OUTPATIENT
Start: 2022-08-08 | End: 2022-08-08 | Stop reason: HOSPADM

## 2022-08-08 RX ORDER — GABAPENTIN 100 MG/1
100 CAPSULE ORAL
Status: COMPLETED | OUTPATIENT
Start: 2022-08-08 | End: 2022-08-08

## 2022-08-08 RX ORDER — NALOXONE HYDROCHLORIDE 0.4 MG/ML
0.4 INJECTION, SOLUTION INTRAMUSCULAR; INTRAVENOUS; SUBCUTANEOUS
Status: DISCONTINUED | OUTPATIENT
Start: 2022-08-08 | End: 2022-08-08 | Stop reason: HOSPADM

## 2022-08-08 RX ORDER — BUPIVACAINE HYDROCHLORIDE 2.5 MG/ML
INJECTION, SOLUTION INFILTRATION; PERINEURAL PRN
Status: DISCONTINUED | OUTPATIENT
Start: 2022-08-08 | End: 2022-08-08 | Stop reason: HOSPADM

## 2022-08-08 RX ORDER — OXYCODONE HYDROCHLORIDE 5 MG/1
5 TABLET ORAL
Status: DISCONTINUED | OUTPATIENT
Start: 2022-08-08 | End: 2022-08-08 | Stop reason: HOSPADM

## 2022-08-08 RX ORDER — EPHEDRINE SULFATE 50 MG/ML
INJECTION, SOLUTION INTRAMUSCULAR; INTRAVENOUS; SUBCUTANEOUS PRN
Status: DISCONTINUED | OUTPATIENT
Start: 2022-08-08 | End: 2022-08-08

## 2022-08-08 RX ORDER — ACETAMINOPHEN 500 MG
1000 TABLET ORAL EVERY 8 HOURS PRN
Status: ON HOLD | COMMUNITY
End: 2022-08-08

## 2022-08-08 RX ORDER — ONDANSETRON 2 MG/ML
4 INJECTION INTRAMUSCULAR; INTRAVENOUS EVERY 30 MIN PRN
Status: DISCONTINUED | OUTPATIENT
Start: 2022-08-08 | End: 2022-08-08 | Stop reason: HOSPADM

## 2022-08-08 RX ORDER — LIDOCAINE HYDROCHLORIDE 20 MG/ML
INJECTION, SOLUTION INFILTRATION; PERINEURAL PRN
Status: DISCONTINUED | OUTPATIENT
Start: 2022-08-08 | End: 2022-08-08

## 2022-08-08 RX ADMIN — Medication 2 G: at 11:30

## 2022-08-08 RX ADMIN — PROPOFOL 200 MG: 10 INJECTION, EMULSION INTRAVENOUS at 11:35

## 2022-08-08 RX ADMIN — SODIUM CHLORIDE, POTASSIUM CHLORIDE, SODIUM LACTATE AND CALCIUM CHLORIDE: 600; 310; 30; 20 INJECTION, SOLUTION INTRAVENOUS at 12:11

## 2022-08-08 RX ADMIN — BUPIVACAINE HYDROCHLORIDE 20 ML: 2.5 INJECTION, SOLUTION EPIDURAL; INFILTRATION; INTRACAUDAL at 11:15

## 2022-08-08 RX ADMIN — GABAPENTIN 100 MG: 100 CAPSULE ORAL at 10:41

## 2022-08-08 RX ADMIN — Medication 100 MCG: at 12:05

## 2022-08-08 RX ADMIN — Medication 10 MG: at 12:05

## 2022-08-08 RX ADMIN — SODIUM CHLORIDE, POTASSIUM CHLORIDE, SODIUM LACTATE AND CALCIUM CHLORIDE: 600; 310; 30; 20 INJECTION, SOLUTION INTRAVENOUS at 11:35

## 2022-08-08 RX ADMIN — ONDANSETRON 4 MG: 2 INJECTION INTRAMUSCULAR; INTRAVENOUS at 12:12

## 2022-08-08 RX ADMIN — LIDOCAINE HYDROCHLORIDE 100 MG: 20 INJECTION, SOLUTION INFILTRATION; PERINEURAL at 11:35

## 2022-08-08 RX ADMIN — Medication 150 MCG: at 11:58

## 2022-08-08 RX ADMIN — Medication 10 MG: at 12:08

## 2022-08-08 RX ADMIN — Medication 200 MCG: at 12:08

## 2022-08-08 RX ADMIN — Medication 100 MCG: at 11:56

## 2022-08-08 RX ADMIN — Medication 5 MG: at 11:58

## 2022-08-08 RX ADMIN — Medication 10 MG: at 12:19

## 2022-08-08 RX ADMIN — DEXMEDETOMIDINE 40 MCG: 100 INJECTION, SOLUTION, CONCENTRATE INTRAVENOUS at 11:15

## 2022-08-08 RX ADMIN — Medication 10 MG: at 12:02

## 2022-08-08 RX ADMIN — DEXAMETHASONE SODIUM PHOSPHATE 4 MG: 4 INJECTION, SOLUTION INTRAMUSCULAR; INTRAVENOUS at 11:49

## 2022-08-08 RX ADMIN — Medication 5 MG: at 11:56

## 2022-08-08 RX ADMIN — Medication 100 MCG: at 12:02

## 2022-08-08 RX ADMIN — Medication 150 MCG: at 11:46

## 2022-08-08 RX ADMIN — PHENYLEPHRINE HYDROCHLORIDE 0.4 MCG/KG/MIN: 10 INJECTION INTRAVENOUS at 12:06

## 2022-08-08 RX ADMIN — Medication 100 MCG: at 11:35

## 2022-08-08 RX ADMIN — DEXAMETHASONE SODIUM PHOSPHATE 2 MG: 10 INJECTION, SOLUTION INTRAMUSCULAR; INTRAVENOUS at 11:15

## 2022-08-08 ASSESSMENT — COPD QUESTIONNAIRES: COPD: 1

## 2022-08-08 NOTE — INTERVAL H&P NOTE
"I have reviewed the surgical (or preoperative) H&P that is linked to this encounter, and examined the patient. There are no significant changes    Clinical Conditions Present on Arrival:  Clinically Significant Risk Factors Present on Admission                   # Overweight: Estimated body mass index is 26.15 kg/m  as calculated from the following:    Height as of 7/15/22: 1.727 m (5' 8\").    Weight as of 7/15/22: 78 kg (172 lb).       "

## 2022-08-08 NOTE — DISCHARGE INSTRUCTIONS
Same-Day Surgery   Adult Discharge Orders & Instructions     For 24 hours after surgery:  Get plenty of rest.  A responsible adult must stay with you for at least 24 hours after you leave the hospital.   Pain medication can slow your reflexes. Do not drive or use heavy equipment.  If you have weakness or tingling, don't drive or use heavy equipment until this feeling goes away.  Mixing alcohol and pain medication can cause dizziness and slow your breathing. It can even be fatal. Do not drink alcohol while taking pain medication.  Avoid strenuous or risky activities.  Ask for help when climbing stairs.   You may feel lightheaded.  If so, sit for a few minutes before standing.  Have someone help you get up.   If you have nausea (feel sick to your stomach), drink only clear liquids such as apple juice, ginger ale, broth or 7-Up.  Rest may also help.  Be sure to drink enough fluids.  Move to a regular diet as you feel able. Take pain medications with a small amount of solid food, such as toast or crackers, to avoid nausea.   A slight fever is normal. Call the doctor if your fever is over 100 F (37.7 C) (taken under the tongue) or lasts longer than 24 hours.  You may have a dry mouth, muscle aches, trouble sleeping or a sore throat.  These symptoms should go away after 24 hours.  Do not make important or legal decisions.   Pain Management:      1. Take pain medication (if prescribed) for pain as directed by your physician.        2. WARNING: If the pain medication you have been prescribed contains Tylenol  (acetaminophen), DO NOT take additional doses of Tylenol (acetaminophen).     Call your doctor for any of the followin.  Signs of infection (fever, growing tenderness at the surgery site, severe pain, a large amount of drainage or bleeding, foul-smelling drainage, redness, swelling).    2.  It has been over 8 to 10 hours since surgery and you are still not able to urinate (pee).    3.  Headache for over 24  hours.    4.  Numbness, tingling or weakness the day after surgery (if you had spinal anesthesia).  To contact a doctor, call Dr Tee Espinoza [facility] [phone]                                  Orthopedic Department [phone]                                  or:  '   [phone] and ask for the Resident On Call for:          ___Orthopedics____ (answered 24 hours a day)  '   Emergency Department:  [facility]: [phone]  [facility]: [phone]               Rev. 10/2014

## 2022-08-08 NOTE — ANESTHESIA CARE TRANSFER NOTE
Patient: Delmis Parrish    Procedure: Procedure(s):  Biopsy Left Tibia  With Curettage and Allograft Placement       Diagnosis: Bone lesion [M89.9]  Diagnosis Additional Information: No value filed.    Anesthesia Type:   General     Note:    Oropharynx: spontaneously breathing and oral airway in place  Level of Consciousness: drowsy  Oxygen Supplementation: face mask  Level of Supplemental Oxygen (L/min / FiO2): 6  Independent Airway: airway patency satisfactory and stable  Dentition: dentition unchanged  Vital Signs Stable: post-procedure vital signs reviewed and stable  Report to RN Given: handoff report given  Patient transferred to: PACU  Comments: To PACU with 02, Spont RR. Monitors applied, VSS, PIV/airway patent, Report to RN all questions/concerns answered.     Handoff Report: Identifed the Patient, Identified the Reponsible Provider, Reviewed the pertinent medical history, Discussed the surgical course, Reviewed Intra-OP anesthesia mangement and issues during anesthesia, Set expectations for post-procedure period and Allowed opportunity for questions and acknowledgement of understanding      Vitals:  Vitals Value Taken Time   /68 08/08/22 1245   Temp 36.5  C (97.7  F) 08/08/22 1239   Pulse 66 08/08/22 1247   Resp 12 08/08/22 1247   SpO2 93 % 08/08/22 1247   Vitals shown include unvalidated device data.    Electronically Signed By: KRYSTAL Rendon CRNA  August 8, 2022  12:49 PM

## 2022-08-08 NOTE — ANESTHESIA PREPROCEDURE EVALUATION
Anesthesia Pre-Procedure Evaluation    Patient: Delmis Parrish   MRN: [id number] : 1950        Procedure : Procedure(s):  Biopsy Left Tibia  With Possible Curettage and Allograft Placement          Past Medical History:   Diagnosis Date     Gastroesophageal reflux disease      Generalized anxiety disorder      Insomnia      LVH (left ventricular hypertrophy)      Major depressive disorder, recurrent episode, mild (H)      Renovascular hypertension       History reviewed. No pertinent surgical history.   Allergies   Allergen Reactions     Adhesive Tape      Paroxetine Nausea and Vomiting     aka paxil       Sertraline Nausea and Vomiting     aka zoloft       Tartrazine Nausea and Vomiting     aka darvon        Social History     Tobacco Use     Smoking status: Never Smoker     Smokeless tobacco: Never Used   Substance Use Topics     Alcohol use: Not Currently      Wt Readings from Last 1 Encounters:   07/15/22 78 kg (172 lb)        Anesthesia Evaluation   Pt has had prior anesthetic.         ROS/MED HX  ENT/Pulmonary:  - neg pulmonary ROS   (+) COPD,     Neurologic:       Cardiovascular:     (+) hypertension-----    METS/Exercise Tolerance:     Hematologic:       Musculoskeletal: Comment: Nerve stimulator      GI/Hepatic:     (+) GERD, Asymptomatic on medication,     Renal/Genitourinary:       Endo:       Psychiatric/Substance Use:     (+) alcohol abuse     Infectious Disease:       Malignancy:       Other:      (+) , H/O Chronic Pain,        Physical Exam    Airway        Mallampati: II   TM distance: > 3 FB   Neck ROM: full   Mouth opening: > 3 cm    Respiratory Devices and Support         Dental  no notable dental history         Cardiovascular   cardiovascular exam normal          Pulmonary   pulmonary exam normal                OUTSIDE LABS:  CBC:   Lab Results   Component Value Date    WBC 7.5 07/15/2022    HGB 14.1 07/15/2022    HGB 10.4 (L) 2007    HCT 42.8 07/15/2022     07/15/2022      BMP:   Lab Results   Component Value Date     07/23/2007    POTASSIUM 3.8 07/23/2007    CHLORIDE 103 07/23/2007    CO2 25 07/23/2007    BUN 15 07/23/2007    CR 0.91 07/23/2007    GLC 94 07/23/2007     COAGS:   Lab Results   Component Value Date    PTT 25 07/23/2007    INR 1.01 07/15/2022     POC: No results found for: BGM, HCG, HCGS  HEPATIC: No results found for: ALBUMIN, PROTTOTAL, ALT, AST, GGT, ALKPHOS, BILITOTAL, BILIDIRECT, DELICIA  OTHER:   Lab Results   Component Value Date    VETO 8.6 07/23/2007       Anesthesia Plan    ASA Status:  3      Anesthesia Type: General.     - Airway: LMA              Consents    Anesthesia Plan(s) and associated risks, benefits, and realistic alternatives discussed. Questions answered and patient/representative(s) expressed understanding.    - Discussed:     - Discussed with:  Patient         Postoperative Care    Pain management: Multi-modal analgesia.   PONV prophylaxis: Ondansetron (or other 5HT-3)     Comments:                Hunter Ribeiro MD

## 2022-08-08 NOTE — OP NOTE
DATE OF SURGERY: 8/8/2022    PREOPERATIVE DIAGNOSIS: Left tibial lesion     POSTOPERATIVE DIAGNOSIS: Left tibial lesion    PROCEDURE: Curettage left tibial lesion and placement of allograft    SURGEON: Tee Espinoza MD     ASSISTANT: Cheryl Ayala PA-C as an assistant was required to help retract and close the wound, and resident help was not available.    PATIENT HISTORY: This 72-year-old has some left knee pain and while she has osteoarthritis she also had a large lytic lesion in the tibia that was not obviously connected to the joint.  A needle biopsy was nondiagnostic and so she presents now for additional diagnostic and therapeutic procedure understanding the risks of fracture bleeding infection pain numbness tingling and weakness.    DESCRIPTION OF PROCEDURE: The patient underwent successful induction of anesthesia.  The left leg was washed and sterilely prepped and draped.  I do an incision for a total knee and 1 along the distal and which was at the level of the tibial tubercle where the lesion was.  After incising skin sharply divided subcutaneous tissue with cautery staying medial to the tibial tubercle.  I used a drill to make a hole in the bone down to this lytic area.  I curetted out the walls of the lesion but the content seemed like normal bone.  There is nothing that would make for a good frozen section.  After curetting over the entire surface of the lesion I copiously irrigated.  I then placed a 10 cc of calcium sulfate cement and packed the remainder of the void with crushed allograft followed by Nela bone wax on the cortex.  The subcutaneous tissue was closed with 2-0 Vicryl and the skin with 4 Monocryl followed by Steri-Strips and a sterile dry dressing.  The patient was extubated and taken to the recovery room in stable condition.  The patient will and will be allowed to weight-bear as tolerated.  I was present for all critical portions of the procedure.  The estimated blood loss  is 50 mL.    Tee Espinoza MD

## 2022-08-08 NOTE — ANESTHESIA PROCEDURE NOTES
Airway       Patient location during procedure: OR  Staff -        CRNA: Reina Burns APRN CRNA       Performed By: CRNA  Consent for Airway        Urgency: elective  Indications and Patient Condition       Induction type:intravenous       Mask difficulty assessment: 1 - vent by mask    Final Airway Details       Final airway type: supraglottic airway    Supraglottic Airway Details        Type: LMA       LMA size: 5    Post intubation assessment        Placement verified by: capnometry, equal breath sounds and chest rise        Number of attempts at approach: 1       Secured with: silk tape       Ease of procedure: easy       Dentition: Intact and Unchanged

## 2022-08-08 NOTE — ANESTHESIA PROCEDURE NOTES
Adductor canal Procedure Note    Pre-Procedure   Staff -        Anesthesiologist:  Toni Valladares MD       Performed By: anesthesiologist       Location: pre-op       Procedure Start/Stop Times: 8/8/2022 11:15 AM and 8/8/2022 11:25 AM       Pre-Anesthestic Checklist: patient identified, IV checked, site marked, risks and benefits discussed, informed consent, monitors and equipment checked, pre-op evaluation, at physician/surgeon's request and post-op pain management  Timeout:       Correct Patient: Yes        Correct Procedure: Yes        Correct Site: Yes        Correct Position: Yes        Correct Laterality: Yes        Site Marked: Yes  Procedure Documentation  Procedure: Adductor canal       Diagnosis: LEFT KNEE SURGERY       Laterality: left       Patient Position: supine       Skin prep: Chloraprep       Local skin infiltrated with 3 mL of 1% lidocaine.        Needle Type: short bevel       Needle Gauge: 21.        Needle Length (Inches): 4        Ultrasound guided       1. Ultrasound was used to identify targeted nerve, plexus, vascular marker, or fascial plane and place a needle adjacent to it in real-time.       2. Ultrasound was used to visualize the spread of anesthetic in close proximity to the above referenced structure.       3. A permanent image is entered into the patient's record.       4. The visualized anatomic structures appeared normal.    Assessment/Narrative         The placement was negative for: blood aspirated, painful injection and site bleeding       Paresthesias: No.       Bolus given via needle..        Secured via.        Insertion/Infusion Method: Single Shot       Complications: none    Medication(s) Administered   Bupivacaine 0.25% PF (Infiltration) - Infiltration   20 mL - 8/8/2022 11:15:00 AM  Dexmedetomidine 4 mcg/mL (Perineural) - Perineural   40 mcg - 8/8/2022 11:15:00 AM  Dexamethasone 10 mg/mL PF (Perineural) - Perineural   2 mg - 8/8/2022 11:15:00  AM  Medication Administration Time: 8/8/2022 11:15 AM

## 2022-08-09 NOTE — ANESTHESIA POSTPROCEDURE EVALUATION
Patient: Delmis Parrish    Procedure: Procedure(s):  Biopsy Left Tibia  With Curettage and Allograft Placement       Anesthesia Type:  General    Note:  Disposition: Outpatient   Postop Pain Control: Uneventful            Sign Out: Well controlled pain   PONV: No   Neuro/Psych: Uneventful            Sign Out: Acceptable/Baseline neuro status   Airway/Respiratory: Uneventful            Sign Out: Acceptable/Baseline resp. status   CV/Hemodynamics: Uneventful            Sign Out: Acceptable CV status; No obvious hypovolemia; No obvious fluid overload   Other NRE: NONE   DID A NON-ROUTINE EVENT OCCUR? No           Last vitals:  Vitals Value Taken Time   /69 08/08/22 1328   Temp 36.5  C (97.7  F) 08/08/22 1239   Pulse 60 08/08/22 1328   Resp 12 08/08/22 1328   SpO2 94 % 08/08/22 1328       Electronically Signed By: Hunter Ribeiro MD  August 8, 2022  3:04 PM

## 2022-08-12 LAB
PATH REPORT.COMMENTS IMP SPEC: NORMAL
PATH REPORT.FINAL DX SPEC: NORMAL
PATH REPORT.GROSS SPEC: NORMAL
PATH REPORT.MICROSCOPIC SPEC OTHER STN: NORMAL
PATH REPORT.RELEVANT HX SPEC: NORMAL
PHOTO IMAGE: NORMAL

## 2022-08-12 PROCEDURE — 88307 TISSUE EXAM BY PATHOLOGIST: CPT | Mod: 26 | Performed by: PATHOLOGY

## 2022-08-12 PROCEDURE — 88311 DECALCIFY TISSUE: CPT | Mod: 26 | Performed by: PATHOLOGY

## 2022-08-27 ENCOUNTER — HEALTH MAINTENANCE LETTER (OUTPATIENT)
Age: 72
End: 2022-08-27

## 2022-09-02 ENCOUNTER — OFFICE VISIT (OUTPATIENT)
Dept: ORTHOPEDICS | Facility: CLINIC | Age: 72
End: 2022-09-02
Payer: COMMERCIAL

## 2022-09-02 VITALS — WEIGHT: 176 LBS | BODY MASS INDEX: 26.67 KG/M2 | HEIGHT: 68 IN

## 2022-09-02 DIAGNOSIS — M85.662 BONE CYST OF LEFT TIBIA: Primary | ICD-10-CM

## 2022-09-02 DIAGNOSIS — M17.12 PRIMARY OSTEOARTHRITIS OF LEFT KNEE: ICD-10-CM

## 2022-09-02 PROCEDURE — 99024 POSTOP FOLLOW-UP VISIT: CPT | Performed by: PHYSICIAN ASSISTANT

## 2022-09-02 NOTE — PROGRESS NOTES
Chief Complaint:  POSTOPERATIVE DIAGNOSIS: Left tibial lesion     8/8/22 PROCEDURE: Curettage left tibial lesion and placement of allograft    HPI: Dlemis is a 72-year-old female here today for follow-up 3 weeks status post above procedure by Dr. Espinoza.  Patient reports that overall she is doing very well.  She still has occasional discomfort for which she takes Tylenol.  She is not using anything for gait assistance.  No other concerns.    Physical Exam: Delmis is a pleasant 72-year-old female who is alert and oriented no apparent distress.  She has a mildly antalgic gait without gait assistance today.  Her dressing from surgery is still in place.  A Mepilex dressing was removed.  Her skin looks excellent.  Her incision is healed with no erythema or drainage.  She has no swelling.  She has full knee range of motion.    Pathology: A. Left tibia, curettage:  - Benign bone fragments, histiocytes and fibrous tissue consistent with reactive/degenerative changes  - Negative for neoplasia    Imaging: We did review her immediate postop AP and lateral knee x-rays today.  This shows the previous lucency now contains cement and this is located within the lateral proximal tibia.  She has significant medial joint line arthritis of the left knee.    Impression: 72-year-old female doing well status post curettage and grafting of left lateral proximal tibia benign bone lesion due to left knee osteoarthritis    Plan: Delmis is doing very well.  She can progress back to all activities as tolerated.  She still does have significant osteoarthritis.  We talked about injections.  She has had cortisone injections in the past with good success.  She will call us when she wants another one.  She did mention that last time she had an injection she got quite a terrible headache, but her previous injection she had no issues.  We went over the risks of steroid injections.  She will call us when she would like to try another injection and otherwise  follow-up as needed.  All questions answered.

## 2022-09-02 NOTE — NURSING NOTE
"Reason For Visit:   Chief Complaint   Patient presents with     RECHECK     Follow-up on Open biopsy of left tibia // DOS: 8/8/22       Ht 1.727 m (5' 8\")   Wt 79.8 kg (176 lb)   BMI 26.76 kg/m      Pain Assessment  Patient Currently in Pain: Yes  0-10 Pain Scale: 4 (patient reports that the swelling has decreased since her procedures)    Tommy Velez, EMT    "

## 2022-09-02 NOTE — LETTER
Date:September 2, 2022      Patient was self referred, no letter generated. Do not send.        [facility] Health Information

## 2022-09-02 NOTE — LETTER
9/2/2022         RE: Delmis Parrish  [address] [facility] [address]        Dear Colleague,    Thank you for referring your patient, Delmis Parrish, to the [facility] [place]. Please see a copy of my visit note below.    Chief Complaint:  POSTOPERATIVE DIAGNOSIS: Left tibial lesion     8/8/22 PROCEDURE: Curettage left tibial lesion and placement of allograft    HPI: Delmis is a 72-year-old female here today for follow-up 3 weeks status post above procedure by Dr. Espinoza.  Patient reports that overall she is doing very well.  She still has occasional discomfort for which she takes Tylenol.  She is not using anything for gait assistance.  No other concerns.    Physical Exam: Delmis is a pleasant 72-year-old female who is alert and oriented no apparent distress.  She has a mildly antalgic gait without gait assistance today.  Her dressing from surgery is still in place.  A Mepilex dressing was removed.  Her skin looks excellent.  Her incision is healed with no erythema or drainage.  She has no swelling.  She has full knee range of motion.    Pathology: A. Left tibia, curettage:  - Benign bone fragments, histiocytes and fibrous tissue consistent with reactive/degenerative changes  - Negative for neoplasia    Imaging: We did review her immediate postop AP and lateral knee x-rays today.  This shows the previous lucency now contains cement and this is located within the lateral proximal tibia.  She has significant medial joint line arthritis of the left knee.    Impression: 72-year-old female doing well status post curettage and grafting of left lateral proximal tibia benign bone lesion due to left knee osteoarthritis    Plan: Delmis is doing very well.  She can progress back to all activities as tolerated.  She still does have significant osteoarthritis.  We talked about injections.  She has had cortisone injections in the past with good success.  She will call us when she wants  another one.  She did mention that last time she had an injection she got quite a terrible headache, but her previous injection she had no issues.  We went over the risks of steroid injections.  She will call us when she would like to try another injection and otherwise follow-up as needed.  All questions answered.          Again, thank you for allowing me to participate in the care of your patient.        Sincerely,        Cheryl Ayala PA-C

## 2022-10-11 ASSESSMENT — KOOS JR
KOOS JR SCORING: 57.14
HOW SEVERE IS YOUR KNEE STIFFNESS AFTER FIRST WAKING IN MORNING: MODERATE
BENDING TO THE FLOOR TO PICK UP OBJECT: MODERATE
RISING FROM SITTING: MILD
TWISING OR PIVOTING ON KNEE: MODERATE
STANDING UPRIGHT: MODERATE
STRAIGHTENING KNEE FULLY: MILD
GOING UP OR DOWN STAIRS: MODERATE

## 2022-10-12 ENCOUNTER — OFFICE VISIT (OUTPATIENT)
Dept: ORTHOPEDICS | Facility: CLINIC | Age: 72
End: 2022-10-12
Payer: COMMERCIAL

## 2022-10-12 DIAGNOSIS — M17.12 PRIMARY OSTEOARTHRITIS OF LEFT KNEE: Primary | ICD-10-CM

## 2022-10-12 PROCEDURE — 20610 DRAIN/INJ JOINT/BURSA W/O US: CPT | Mod: 79 | Performed by: ORTHOPAEDIC SURGERY

## 2022-10-12 PROCEDURE — 99213 OFFICE O/P EST LOW 20 MIN: CPT | Mod: 24 | Performed by: ORTHOPAEDIC SURGERY

## 2022-10-12 RX ORDER — TRIAMCINOLONE ACETONIDE 40 MG/ML
40 INJECTION, SUSPENSION INTRA-ARTICULAR; INTRAMUSCULAR
Status: SHIPPED | OUTPATIENT
Start: 2022-10-12

## 2022-10-12 RX ORDER — LIDOCAINE HYDROCHLORIDE 10 MG/ML
9 INJECTION, SOLUTION EPIDURAL; INFILTRATION; INTRACAUDAL; PERINEURAL
Status: SHIPPED | OUTPATIENT
Start: 2022-10-12

## 2022-10-12 RX ADMIN — TRIAMCINOLONE ACETONIDE 40 MG: 40 INJECTION, SUSPENSION INTRA-ARTICULAR; INTRAMUSCULAR at 14:42

## 2022-10-12 RX ADMIN — LIDOCAINE HYDROCHLORIDE 9 ML: 10 INJECTION, SOLUTION EPIDURAL; INFILTRATION; INTRACAUDAL; PERINEURAL at 14:42

## 2022-10-12 NOTE — NURSING NOTE
Reason For Visit:   Chief Complaint   Patient presents with     RECHECK     Requesting left knee injection          There were no vitals taken for this visit.    Pain Assessment  Patient Currently in Pain: Yes  0-10 Pain Scale: 6  Primary Pain Location: Knee (left knee)      Rafael Rodriguez ATC

## 2022-10-12 NOTE — LETTER
10/12/2022         RE: Delmis Parrish  [address]  [place] [address]        Dear Colleague,    Thank you for referring your patient, Delmis Parrish, to the [facility] [place]. Please see a copy of my visit note below.        [facility]  Orthopaedic Surgery  by Kwesi Allen, Resident    Delmis Parrish MRN# [id number]    YOB: 1950            Assessment and Plan:   Assessment:  2 months s/p curettage and calcium sulfate cement packing of left proximal tibia cyst. Presents today for management of left knee pain consistent with left knee osteoarthritis     Plan:  Left knee corticosteroid injection   Follow up PRN     Kwesi Allen MD   Orthopaedic Surgery    Above patient and plan were discussed with orthopaedic staff, Dr. Espinoza , who is in agreement.           History of Present Illness:   72 year old female with chronic left knee pain. Xray was significant for a cystic proximal tibial lesion. She is 2 months s/p curettage and calcium sulfate packing. She has healed very well and is now primarily complaining of medial sided joint pain. She presents today looking for a corticosteroid injection.     She has had 2 prior injections with no allergic reactions and extended relief. Last injection was ~ 3/2022.     Her pain is being adequately managed with non-operative modalities and she is not interested in pursuing surgery at this time.          Physical Exam:       VITAL SIGNS: There were no vitals taken for this visit..   There is no height or weight on file to calculate BMI.  NEURO: grossly normal , no motor deficits  RESP: non labored breathing  on RA  CARDIOVASCULAR: Extremities are warm and well perfused   MUSCULOSKELETAL:         Left Lower Extremity: No deformity, skin intact.   No significant effusion.   Full extension to 120 flexion   Stable to varus/valgus exam   Significant laxity with anterior drawer. Posterior drawer appears stable.   + medial  joint line tenderness   Motor intact distally TA/GSC  Endorses decreased sensation in tibial nerve distribution which is consistent with her baseline neuropathy   Remaining sensation about the foot is grossly intact  Foot is warm and well perfused.                   Laboratory:   No new labs obtained today               Imaging:     XR imaging of the left knee obtained 8/8/22 reveals: interval cement packing of prior proximal tibial lesion. Medial > Lateral compartmental arthritis.           I was present with the resident during the history and exam.  I discussed the case with the resident and agree with the findings as documented in the assessment and plan.    Large Joint Injection/Arthocentesis: L knee joint    Date/Time: 10/12/2022 2:42 PM  Performed by: Kwesi Allen MD  Authorized by: Tee Espinoza MD     Indications:  Pain  Needle Size:  25 G  Guidance: landmark guided    Location:  Knee      Medications:  40 mg triamcinolone 40 MG/ML; 9 mL lidocaine (PF) 1 %  Outcome:  Tolerated well, no immediate complications  Procedure discussed: discussed risks, benefits, and alternatives    Consent Given by:  Patient  Timeout: timeout called immediately prior to procedure    Prep: patient was prepped and draped in usual sterile fashion              Again, thank you for allowing me to participate in the care of your patient.        Sincerely,        Tee Espinoza MD

## 2022-10-12 NOTE — PROGRESS NOTES
[facility]  Orthopaedic Surgery  by Kwesi Allen, Resident    Delmis Parrish MRN# [id number]    YOB: 1950            Assessment and Plan:   Assessment:  2 months s/p curettage and calcium sulfate cement packing of left proximal tibia cyst. Presents today for management of left knee pain consistent with left knee osteoarthritis     Plan:  Left knee corticosteroid injection   Follow up PRN     Kwesi Allen MD   Orthopaedic Surgery    Above patient and plan were discussed with orthopaedic staff, Dr. Espinoza , who is in agreement.           History of Present Illness:   72 year old female with chronic left knee pain. Xray was significant for a cystic proximal tibial lesion. She is 2 months s/p curettage and calcium sulfate packing. She has healed very well and is now primarily complaining of medial sided joint pain. She presents today looking for a corticosteroid injection.     She has had 2 prior injections with no allergic reactions and extended relief. Last injection was ~ 3/2022.     Her pain is being adequately managed with non-operative modalities and she is not interested in pursuing surgery at this time.          Physical Exam:       VITAL SIGNS: There were no vitals taken for this visit..   There is no height or weight on file to calculate BMI.  NEURO: grossly normal , no motor deficits  RESP: non labored breathing  on RA  CARDIOVASCULAR: Extremities are warm and well perfused   MUSCULOSKELETAL:         Left Lower Extremity: No deformity, skin intact.   No significant effusion.   Full extension to 120 flexion   Stable to varus/valgus exam   Significant laxity with anterior drawer. Posterior drawer appears stable.   + medial joint line tenderness   Motor intact distally TA/GSC  Endorses decreased sensation in tibial nerve distribution which is consistent with her baseline neuropathy   Remaining sensation about the foot is grossly intact  Foot is warm and well perfused.                    Laboratory:   No new labs obtained today               Imaging:     XR imaging of the left knee obtained 8/8/22 reveals: interval cement packing of prior proximal tibial lesion. Medial > Lateral compartmental arthritis.

## 2022-10-12 NOTE — LETTER
Date:October 13, 2022      Patient was self referred, no letter generated. Do not send.        [facility] Health Information

## 2022-10-12 NOTE — NURSING NOTE
[facility] [address] [address] [phone]  Dept: [phone]  ______________________________________________________________________________    Patient: Delmis Parrish   : 1950   MRN: [id number]   2022    INVASIVE PROCEDURE SAFETY CHECKLIST    Date: 10/12/2022   Procedure:Left knee cortisone injection  Patient Name: Delmis Parrish  MRN: [id number]  YOB: 1950    Action: Complete sections as appropriate. Any discrepancy results in a HARD COPY until resolved.     PRE PROCEDURE:  Patient ID verified with 2 identifiers (name and  or MRN): Yes  Procedure and site verified with patient/designee (when able): Yes  Accurate consent documentation in medical record: Yes  H&P (or appropriate assessment) documented in medical record: NA  H&P must be up to 20 days prior to procedure and updates within 24 hours of procedure as applicable: NA  Relevant diagnostic and radiology test results appropriately labeled and displayed as applicable: Yes  Procedure site(s) marked with provider initials: NA    TIMEOUT:  Time-Out performed immediately prior to starting procedure, including verbal and active participation of all team members addressing the following:Yes  * Correct patient identify  * Confirmed that the correct side and site are marked  * An accurate procedure consent form  * Agreement on the procedure to be done  * Correct patient position  * Relevant images and results are properly labeled and appropriately displayed  * The need to administer antibiotics or fluids for irrigation purposes during the procedure as applicable   * Safety precautions based on patient history or medication use    DURING PROCEDURE: Verification of correct person, site, and procedures any time the responsibility for care of the patient is transferred to another member of the care team.       The following medications were given:          Prior to injection, verified patient identity using patient's name and date of birth.  Due to injection administration, patient instructed to remain in clinic for 15 minutes  afterwards, and to report any adverse reaction to me immediately.    Joint injection was performed.    Medication Name: Lidocaine NDC 77738-037-98  Drug Amount Wasted:  Yes: 11 mg/ml   Vial/Syringe: Single dose vial  Expiration Date:  1/1/26  Lot: [id number]    Medication Name Kenalog NDC [address]-2    Scribed by Mariama Rodriguez ATC for Dr. Espinoza on October 12, 2022 at 2:30 pm based on the provider's statements to me.     Mariama Rodriguez ATC

## 2022-10-12 NOTE — PROGRESS NOTES
Large Joint Injection/Arthocentesis: L knee joint    Date/Time: 10/12/2022 2:42 PM  Performed by: Kwesi Allen MD  Authorized by: Tee Espinoza MD     Indications:  Pain  Needle Size:  25 G  Guidance: landmark guided    Location:  Knee      Medications:  40 mg triamcinolone 40 MG/ML; 9 mL lidocaine (PF) 1 %  Outcome:  Tolerated well, no immediate complications  Procedure discussed: discussed risks, benefits, and alternatives    Consent Given by:  Patient  Timeout: timeout called immediately prior to procedure    Prep: patient was prepped and draped in usual sterile fashion

## 2023-01-07 ENCOUNTER — HEALTH MAINTENANCE LETTER (OUTPATIENT)
Age: 73
End: 2023-01-07

## 2023-02-09 ENCOUNTER — TELEPHONE (OUTPATIENT)
Dept: ORTHOPEDICS | Facility: CLINIC | Age: 73
End: 2023-02-09
Payer: COMMERCIAL

## 2023-02-09 NOTE — TELEPHONE ENCOUNTER
[facility] Call Center    Phone Message    May a detailed message be left on voicemail: no     Reason for Call: Other: Requesting c/b- schedule surgery for left knee    Action Taken: Message routed to:  [facility]    Travel Screening: Not Applicable

## 2023-02-09 NOTE — TELEPHONE ENCOUNTER
Returned call to patient.  Advised that she would need to make an appointment to see Dr. Espinoza to discuss the surgery.  I didn't see that he had mentioned anything about surgery at her last visit with him.  Appointment made with dr. Espinoza on 2/17/23 at 1115 am.

## 2023-02-17 ENCOUNTER — OFFICE VISIT (OUTPATIENT)
Dept: ORTHOPEDICS | Facility: CLINIC | Age: 73
End: 2023-02-17
Payer: COMMERCIAL

## 2023-02-17 ENCOUNTER — ANCILLARY PROCEDURE (OUTPATIENT)
Dept: GENERAL RADIOLOGY | Facility: CLINIC | Age: 73
End: 2023-02-17
Attending: ORTHOPAEDIC SURGERY
Payer: COMMERCIAL

## 2023-02-17 VITALS — BODY MASS INDEX: 29.66 KG/M2 | WEIGHT: 189 LBS | HEIGHT: 67 IN

## 2023-02-17 DIAGNOSIS — M17.12 PRIMARY OSTEOARTHRITIS OF LEFT KNEE: Primary | ICD-10-CM

## 2023-02-17 DIAGNOSIS — M17.12 PRIMARY OSTEOARTHRITIS OF LEFT KNEE: ICD-10-CM

## 2023-02-17 PROCEDURE — 73562 X-RAY EXAM OF KNEE 3: CPT | Mod: LT | Performed by: RADIOLOGY

## 2023-02-17 PROCEDURE — 99214 OFFICE O/P EST MOD 30 MIN: CPT | Performed by: ORTHOPAEDIC SURGERY

## 2023-02-17 ASSESSMENT — PATIENT HEALTH QUESTIONNAIRE - PHQ9: SUM OF ALL RESPONSES TO PHQ QUESTIONS 1-9: 9

## 2023-02-17 NOTE — PROGRESS NOTES
This patient is 6 months out from curettage and placement of synthetic graft into the defect in the tibia.  She has medial compartment arthritis as well with joint space narrowing osteophyte formation and subchondral sclerosis in that joint.  She takes extra-strength Tylenol twice a day every day.  She occasionally takes ibuprofen for the left knee but her stomach does not tolerate this well.  She has pain standing up and sitting down and medial knee pain with many activities.  She is complaining that it is difficult to get on and off horse because of her knee stiffness and flexion.    On examination she is alert oriented has a normal mood and affect and is in no acute distress peer respirations are regular and unlabored eyes are nonicteric.  She has some mild swelling in the left knee but no edema or erythema.  Her scar is well-healed from her surgery 6 months ago.    I reviewed her old x-rays and we will plan to get new ones today.  This shows medial compartment arthritis with the narrowing osteophytes and the synthetic allograft in place from her cyst curettage.    The patient has elected to go ahead with the left total knee.  She understands the risks of pain stiffness infection bleeding and deep venous thrombosis and pulmonary embolism.  I have answered all of her questions today.

## 2023-02-17 NOTE — NURSING NOTE
"Reason For Visit:   Chief Complaint   Patient presents with     RECHECK     Discuss left total knee surgery        Ht 1.7 m (5' 6.93\")   Wt 85.7 kg (189 lb)   BMI 29.66 kg/m      Pain Assessment  Patient Currently in Pain: Yes  0-10 Pain Scale: 5  Primary Pain Location: Knee (left)      Rafael Rodriguez ATC    "

## 2023-02-17 NOTE — LETTER
2/17/2023         RE: Delmis Parrish  [address] [facility] [address]        Dear Colleague,    Thank you for referring your patient, Delmis Parrish, to the [facility] [place]. Please see a copy of my visit note below.    This patient is 6 months out from curettage and placement of synthetic graft into the defect in the tibia.  She has medial compartment arthritis as well with joint space narrowing osteophyte formation and subchondral sclerosis in that joint.  She takes extra-strength Tylenol twice a day every day.  She occasionally takes ibuprofen for the left knee but her stomach does not tolerate this well.  She has pain standing up and sitting down and medial knee pain with many activities.  She is complaining that it is difficult to get on and off horse because of her knee stiffness and flexion.    On examination she is alert oriented has a normal mood and affect and is in no acute distress peer respirations are regular and unlabored eyes are nonicteric.  She has some mild swelling in the left knee but no edema or erythema.  Her scar is well-healed from her surgery 6 months ago.    I reviewed her old x-rays and we will plan to get new ones today.  This shows medial compartment arthritis with the narrowing osteophytes and the synthetic allograft in place from her cyst curettage.    The patient has elected to go ahead with the left total knee.  She understands the risks of pain stiffness infection bleeding and deep venous thrombosis and pulmonary embolism.  I have answered all of her questions today.      Again, thank you for allowing me to participate in the care of your patient.        Sincerely,        Tee Espinoza MD

## 2023-02-17 NOTE — NURSING NOTE
Pre-Op Teaching was done in person at the clinic.    Teaching Flowsheet   Relevant Diagnosis: Pre-Op Teaching  Teaching Topic: total knee arthroplasty     Person(s) involved in teaching:   Patient     Motivation Level:  Asks Questions: Yes  Eager to Learn: Yes  Cooperative: Yes  Receptive (willing/able to accept information): Yes  Any cultural factors/Protestant beliefs that may influence understanding or compliance? No     Patient demonstrates understanding of the following:  Reason for the appointment, diagnosis and treatment plan: Yes  Knowledge of proper use of medications and conditions for which they are ordered (with special attention to potential side effects or drug interactions): Yes  Which situations necessitate calling provider and whom to contact: Yes- discussed the stoplight tool to help assist with this.      Teaching Concerns Addressed:      Proper use of surgical scrub explain and provided to patient.    Nutritional needs and diet plan: Yes  Pain management techniques: Yes  Wound Care: Yes  How and/when to access community resources: Yes     Instructional Materials Used/Given:  2 bottle of chlorhexidine, a surgery packet and a joint booklet given to patient in clinic     - Important contact info/ phone numbers  - Map/ location of surgery  - Medications to avoid  - Showering instructions  - Stop light tool    Additionally the following was discussed with patient:  - Patient and , will be driving the patient to surgery and staying with them for 24 hours.        -Next step: schedule a surgery date and schedule a Pre-Op appointment with PCP    Time spent with patient: 15 minutes.

## 2023-02-17 NOTE — LETTER
Date:February 17, 2023      Provider requested that no letter be sent. Do not send.       [facility]

## 2023-02-24 ENCOUNTER — TELEPHONE (OUTPATIENT)
Dept: ORTHOPEDICS | Facility: CLINIC | Age: 73
End: 2023-02-24
Payer: COMMERCIAL

## 2023-02-24 NOTE — TELEPHONE ENCOUNTER
[facility] Call Center    Phone Message    May a detailed message be left on voicemail: yes     Reason for Call: Other: Would like to schedule surgery      Action Taken: Other: ortho csc    Travel Screening: Not Applicable

## 2023-02-27 NOTE — TELEPHONE ENCOUNTER
Phoned patient to get her scheduled for surgery with Dr. Espinoza    Patient was unavailable,   Provided call back number in voicemail:   [phone] and [phone]

## 2023-02-28 ENCOUNTER — TRANSFERRED RECORDS (OUTPATIENT)
Dept: HEALTH INFORMATION MANAGEMENT | Facility: CLINIC | Age: 73
End: 2023-02-28
Payer: COMMERCIAL

## 2023-02-28 NOTE — TELEPHONE ENCOUNTER
Patient is scheduled for surgery with Dr. Espinoza    Spoke with: Delmis    Date of Surgery: 3/27/23    [facility]    Informed patient they will need an adult  Yes    H&P: Scheduled with PCP    Pre-procedure COVID-19 Test: N/A    Additional imaging/appointments: POP Made    Surgery packet: Received     Additional comments: N/A

## 2023-03-13 PROBLEM — G62.1 ALCOHOL-INDUCED POLYNEUROPATHY (H): Status: ACTIVE | Noted: 2020-04-22

## 2023-03-13 PROBLEM — N39.3 STRESS INCONTINENCE: Status: ACTIVE | Noted: 2021-06-29

## 2023-03-13 PROBLEM — N95.1 MENOPAUSAL SYNDROME (HOT FLASHES): Status: ACTIVE | Noted: 2019-03-20

## 2023-03-13 PROBLEM — J47.9 BRONCHIECTASIS WITHOUT COMPLICATION (H): Status: ACTIVE | Noted: 2018-04-19

## 2023-03-13 RX ORDER — CELECOXIB 50 MG/1
50-100 CAPSULE ORAL DAILY PRN
COMMUNITY
Start: 2023-01-09

## 2023-03-14 NOTE — PROGRESS NOTES
Ortho Navigator Note      Pre-op Date 2/28/23  [place]      Medical Clearance  Cleared     Labs WNR     COVID Test Date No longer indicated      Skin  Intact      Activity: Ambulates independently without assistive device. Activity limited to pain and deconditioning related to hx of long COVID.      Equipment Need Patient will likely need a walker for discharge. Defer to PT/OT for recs.       Meds to Hold Held all supplements 14 days prior to surgery  Hold ASA and Celebrex 7 days prior to surgery    * Medication recommendations are not intended to be exhaustive; they are limited to common medications that are potentially dangerous if incorrectly managed   NPO Instructions  Defer to PAN RN     Pre-op Joint Education Complete? Complete   Discharge Plan Patient has plan to discharge home on morning of POD 1.   Chris () will arrive at hospital at [id number] to participate in therapy and discharge education. They will then transport patient home    /Transportation Chris will be  and transportation.  is physically able to care for patient.      Barriers to Discharge No barriers to discharge.      Additional Info/   Special Needs : Patient had no unanswered questions or concerns.            03/14/23 1119   Discharge Planning   Patient/Family Anticipates Transition to home with family   Concerns to be Addressed no discharge needs identified   Living Arrangements   People in Home significant other   Type of Residence Private Residence   Is your private residence a single family home or apartment? Single family home   Number of Stairs, Within Home, Primary none   Stair Railings, Within Home, Primary none   Once home, are you able to live on one level? Yes   Which level? Main Level   Bathroom Shower/Tub Tub/Shower unit   Equipment Currently Used at Home shower chair   Support System   Support Systems Spouse/Significant Other   Do you have someone available to stay with you one or two nights once you are  home? Yes   Medical Clearance   Date of Physical 02/28/23   Clinic Name  [facility]   It is recommended that you call and check with any specialty providers before surgery to see if you need surgical clearance.  Do you see any specialty providers outside of your primary care provider? No   Blood   Known Bleeding Disorder or Coagulopathy No   Does the patient have any Episcopalian/cultural preferences related to blood products? No   Education   Has the patient scheduled or completed pre-op total joint education, either in class or online, in the last 12 months? Yes   Patient attended total joint pre-op class/received pre-op teaching  online   Relationship/Environment   Name(s) of People in Home Chris

## 2023-03-23 ENCOUNTER — TELEPHONE (OUTPATIENT)
Dept: ORTHOPEDICS | Facility: CLINIC | Age: 73
End: 2023-03-23
Payer: COMMERCIAL

## 2023-03-23 NOTE — TELEPHONE ENCOUNTER
[facility] Call Center    Phone Message    May a detailed message be left on voicemail: yes     Reason for Call: Other: Pt calling to set up post op appts she has surg scheduled for 03/27     Action Taken: Other: ortho csc    Travel Screening: Not Applicable

## 2023-03-23 NOTE — TELEPHONE ENCOUNTER
Phoned patient to get her POP schedule with Dr. Espinoza.     Call went to voicemail; provided brief msg and gina back number of [phone].

## 2023-03-24 NOTE — TELEPHONE ENCOUNTER
Received call from patient wanting to get her post-op schedule with Dr. Espinoza.     In this encounter, patient was schedule for 2 week silvana with Dr. Espinoza on 4/12/23. Patient had no further questions or concerns.

## 2023-03-26 ENCOUNTER — ANESTHESIA EVENT (OUTPATIENT)
Dept: SURGERY | Facility: CLINIC | Age: 73
End: 2023-03-26
Payer: COMMERCIAL

## 2023-03-27 ENCOUNTER — ANESTHESIA (OUTPATIENT)
Dept: SURGERY | Facility: CLINIC | Age: 73
End: 2023-03-27
Payer: COMMERCIAL

## 2023-03-27 ENCOUNTER — HOSPITAL ENCOUNTER (OUTPATIENT)
Facility: CLINIC | Age: 73
Discharge: HOME | End: 2023-03-28
Attending: ORTHOPAEDIC SURGERY | Admitting: ORTHOPAEDIC SURGERY
Payer: COMMERCIAL

## 2023-03-27 ENCOUNTER — APPOINTMENT (OUTPATIENT)
Dept: PHYSICAL THERAPY | Facility: CLINIC | Age: 73
End: 2023-03-27
Attending: ORTHOPAEDIC SURGERY
Payer: COMMERCIAL

## 2023-03-27 ENCOUNTER — APPOINTMENT (OUTPATIENT)
Dept: GENERAL RADIOLOGY | Facility: CLINIC | Age: 73
End: 2023-03-27
Attending: ORTHOPAEDIC SURGERY
Payer: COMMERCIAL

## 2023-03-27 DIAGNOSIS — M17.12 PRIMARY OSTEOARTHRITIS OF LEFT KNEE: Primary | ICD-10-CM

## 2023-03-27 LAB
ANION GAP SERPL CALCULATED.3IONS-SCNC: 11 MMOL/L (ref 7–15)
BUN SERPL-MCNC: 20 MG/DL (ref 8–23)
CALCIUM SERPL-MCNC: 9.2 MG/DL (ref 8.8–10.2)
CHLORIDE SERPL-SCNC: 104 MMOL/L (ref 98–107)
CREAT SERPL-MCNC: 0.91 MG/DL (ref 0.51–0.95)
DEPRECATED HCO3 PLAS-SCNC: 23 MMOL/L (ref 22–29)
ERYTHROCYTE [DISTWIDTH] IN BLOOD BY AUTOMATED COUNT: 13.6 % (ref 10–15)
GFR SERPL CREATININE-BSD FRML MDRD: 67 ML/MIN/1.73M2
GLUCOSE BLDC GLUCOMTR-MCNC: 90 MG/DL (ref 70–99)
GLUCOSE SERPL-MCNC: 147 MG/DL (ref 70–99)
HCT VFR BLD AUTO: 40.4 % (ref 35–47)
HGB BLD-MCNC: 13.1 G/DL (ref 11.7–15.7)
HGB BLD-MCNC: 14.4 G/DL (ref 11.7–15.7)
MCH RBC QN AUTO: 29 PG (ref 26.5–33)
MCHC RBC AUTO-ENTMCNC: 32.4 G/DL (ref 31.5–36.5)
MCV RBC AUTO: 89 FL (ref 78–100)
PLATELET # BLD AUTO: 228 10E3/UL (ref 150–450)
POTASSIUM SERPL-SCNC: 4 MMOL/L (ref 3.4–5.3)
RBC # BLD AUTO: 4.52 10E6/UL (ref 3.8–5.2)
SODIUM SERPL-SCNC: 138 MMOL/L (ref 136–145)
WBC # BLD AUTO: 10.6 10E3/UL (ref 4–11)

## 2023-03-27 PROCEDURE — 85018 HEMOGLOBIN: CPT | Performed by: PHYSICIAN ASSISTANT

## 2023-03-27 PROCEDURE — 272N000001 HC OR GENERAL SUPPLY STERILE: Performed by: ORTHOPAEDIC SURGERY

## 2023-03-27 PROCEDURE — 250N000011 HC RX IP 250 OP 636: Performed by: STUDENT IN AN ORGANIZED HEALTH CARE EDUCATION/TRAINING PROGRAM

## 2023-03-27 PROCEDURE — 258N000001 HC RX 258: Performed by: ORTHOPAEDIC SURGERY

## 2023-03-27 PROCEDURE — 250N000013 HC RX MED GY IP 250 OP 250 PS 637: Performed by: PHYSICIAN ASSISTANT

## 2023-03-27 PROCEDURE — 258N000003 HC RX IP 258 OP 636: Performed by: STUDENT IN AN ORGANIZED HEALTH CARE EDUCATION/TRAINING PROGRAM

## 2023-03-27 PROCEDURE — 360N000077 HC SURGERY LEVEL 4, PER MIN: Performed by: ORTHOPAEDIC SURGERY

## 2023-03-27 PROCEDURE — 258N000003 HC RX IP 258 OP 636: Performed by: NURSE ANESTHETIST, CERTIFIED REGISTERED

## 2023-03-27 PROCEDURE — 99222 1ST HOSP IP/OBS MODERATE 55: CPT | Performed by: PHYSICIAN ASSISTANT

## 2023-03-27 PROCEDURE — 999N000065 XR KNEE PORT LEFT 1/2 VIEWS: Mod: LT

## 2023-03-27 PROCEDURE — 710N000010 HC RECOVERY PHASE 1, LEVEL 2, PER MIN: Performed by: ORTHOPAEDIC SURGERY

## 2023-03-27 PROCEDURE — 250N000013 HC RX MED GY IP 250 OP 250 PS 637: Performed by: STUDENT IN AN ORGANIZED HEALTH CARE EDUCATION/TRAINING PROGRAM

## 2023-03-27 PROCEDURE — C1713 ANCHOR/SCREW BN/BN,TIS/BN: HCPCS | Performed by: ORTHOPAEDIC SURGERY

## 2023-03-27 PROCEDURE — 36415 COLL VENOUS BLD VENIPUNCTURE: CPT | Performed by: PHYSICIAN ASSISTANT

## 2023-03-27 PROCEDURE — 250N000011 HC RX IP 250 OP 636: Performed by: PHYSICIAN ASSISTANT

## 2023-03-27 PROCEDURE — 97161 PT EVAL LOW COMPLEX 20 MIN: CPT | Mod: GP

## 2023-03-27 PROCEDURE — 999N000141 HC STATISTIC PRE-PROCEDURE NURSING ASSESSMENT: Performed by: ORTHOPAEDIC SURGERY

## 2023-03-27 PROCEDURE — 250N000009 HC RX 250: Performed by: STUDENT IN AN ORGANIZED HEALTH CARE EDUCATION/TRAINING PROGRAM

## 2023-03-27 PROCEDURE — 370N000017 HC ANESTHESIA TECHNICAL FEE, PER MIN: Performed by: ORTHOPAEDIC SURGERY

## 2023-03-27 PROCEDURE — C1776 JOINT DEVICE (IMPLANTABLE): HCPCS | Performed by: ORTHOPAEDIC SURGERY

## 2023-03-27 PROCEDURE — 250N000011 HC RX IP 250 OP 636: Performed by: NURSE ANESTHETIST, CERTIFIED REGISTERED

## 2023-03-27 PROCEDURE — 250N000009 HC RX 250: Performed by: NURSE ANESTHETIST, CERTIFIED REGISTERED

## 2023-03-27 PROCEDURE — 250N000009 HC RX 250: Performed by: ORTHOPAEDIC SURGERY

## 2023-03-27 PROCEDURE — 36415 COLL VENOUS BLD VENIPUNCTURE: CPT | Performed by: ANESTHESIOLOGY

## 2023-03-27 PROCEDURE — 82310 ASSAY OF CALCIUM: CPT | Performed by: PHYSICIAN ASSISTANT

## 2023-03-27 PROCEDURE — 27447 TOTAL KNEE ARTHROPLASTY: CPT | Mod: LT | Performed by: ORTHOPAEDIC SURGERY

## 2023-03-27 PROCEDURE — 250N000025 HC SEVOFLURANE, PER MIN: Performed by: ORTHOPAEDIC SURGERY

## 2023-03-27 PROCEDURE — 97110 THERAPEUTIC EXERCISES: CPT | Mod: GP

## 2023-03-27 PROCEDURE — 258N000003 HC RX IP 258 OP 636: Performed by: PHYSICIAN ASSISTANT

## 2023-03-27 PROCEDURE — 97116 GAIT TRAINING THERAPY: CPT | Mod: GP

## 2023-03-27 PROCEDURE — 250N000011 HC RX IP 250 OP 636: Performed by: ANESTHESIOLOGY

## 2023-03-27 PROCEDURE — 85027 COMPLETE CBC AUTOMATED: CPT | Performed by: ANESTHESIOLOGY

## 2023-03-27 DEVICE — SIMPLEX® HV IS A FAST-SETTING ACRYLIC RESIN FOR USE IN BONE SURGERY. MIXING THE TWO SEPARATE STERILE COMPONENTS PRODUCES A DUCTILE BONE CEMENT WHICH, AFTER HARDENING, FIXES THE IMPLANT AND TRANSFERS STRESSES PRODUCED DURING MOVEMENT EVENLY TO THE BONE. SIMPLEX® HV CEMENT POWDER ALSO CONTAINS INSOLUBLE ZIRCONIUM DIOXIDE AS AN X-RAY CONTRAST MEDIUM. SIMPLEX® HV DOES NOT EMIT A SIGNAL AND DOES NOT POSE A SAFETY RISK IN A MAGNETIC RESONANCE ENVIRONMENT.
Type: IMPLANTABLE DEVICE | Site: KNEE | Status: FUNCTIONAL
Brand: SIMPLEX HV

## 2023-03-27 DEVICE — PATELLA
Type: IMPLANTABLE DEVICE | Site: KNEE | Status: FUNCTIONAL
Brand: TRIATHLON

## 2023-03-27 DEVICE — POSTERIOR STABILIZED FEMORAL
Type: IMPLANTABLE DEVICE | Site: KNEE | Status: FUNCTIONAL
Brand: TRIATHLON

## 2023-03-27 DEVICE — TIBIAL BEARING INSERT - PS
Type: IMPLANTABLE DEVICE | Site: KNEE | Status: FUNCTIONAL
Brand: TRIATHLON

## 2023-03-27 DEVICE — UNIVERSAL TIBIAL BASEPLATE
Type: IMPLANTABLE DEVICE | Site: KNEE | Status: FUNCTIONAL
Brand: TRIATHLON

## 2023-03-27 RX ORDER — NORTRIPTYLINE HCL 10 MG
40 CAPSULE ORAL AT BEDTIME
Status: DISCONTINUED | OUTPATIENT
Start: 2023-03-27 | End: 2023-03-28 | Stop reason: HOSPADM

## 2023-03-27 RX ORDER — ONDANSETRON 4 MG/1
4 TABLET, ORALLY DISINTEGRATING ORAL EVERY 6 HOURS PRN
Status: DISCONTINUED | OUTPATIENT
Start: 2023-03-27 | End: 2023-03-28 | Stop reason: HOSPADM

## 2023-03-27 RX ORDER — AMLODIPINE BESYLATE 5 MG/1
5 TABLET ORAL 2 TIMES DAILY
Status: DISCONTINUED | OUTPATIENT
Start: 2023-03-28 | End: 2023-03-28 | Stop reason: HOSPADM

## 2023-03-27 RX ORDER — POLYETHYLENE GLYCOL 3350 17 G/17G
17 POWDER, FOR SOLUTION ORAL DAILY
Status: DISCONTINUED | OUTPATIENT
Start: 2023-03-28 | End: 2023-03-28 | Stop reason: HOSPADM

## 2023-03-27 RX ORDER — ONDANSETRON 2 MG/ML
4 INJECTION INTRAMUSCULAR; INTRAVENOUS EVERY 6 HOURS PRN
Status: DISCONTINUED | OUTPATIENT
Start: 2023-03-27 | End: 2023-03-28 | Stop reason: HOSPADM

## 2023-03-27 RX ORDER — NALOXONE HYDROCHLORIDE 0.4 MG/ML
0.4 INJECTION, SOLUTION INTRAMUSCULAR; INTRAVENOUS; SUBCUTANEOUS
Status: DISCONTINUED | OUTPATIENT
Start: 2023-03-27 | End: 2023-03-28 | Stop reason: HOSPADM

## 2023-03-27 RX ORDER — PROPOFOL 10 MG/ML
INJECTION, EMULSION INTRAVENOUS CONTINUOUS PRN
Status: DISCONTINUED | OUTPATIENT
Start: 2023-03-27 | End: 2023-03-27

## 2023-03-27 RX ORDER — ZOLPIDEM TARTRATE 5 MG/1
5 TABLET ORAL
Status: DISCONTINUED | OUTPATIENT
Start: 2023-03-27 | End: 2023-03-27

## 2023-03-27 RX ORDER — GLYCOPYRROLATE 0.2 MG/ML
INJECTION, SOLUTION INTRAMUSCULAR; INTRAVENOUS PRN
Status: DISCONTINUED | OUTPATIENT
Start: 2023-03-27 | End: 2023-03-27

## 2023-03-27 RX ORDER — FENTANYL CITRATE 50 UG/ML
50 INJECTION, SOLUTION INTRAMUSCULAR; INTRAVENOUS EVERY 5 MIN PRN
Status: DISCONTINUED | OUTPATIENT
Start: 2023-03-27 | End: 2023-03-27 | Stop reason: HOSPADM

## 2023-03-27 RX ORDER — HYDROMORPHONE HCL IN WATER/PF 6 MG/30 ML
0.4 PATIENT CONTROLLED ANALGESIA SYRINGE INTRAVENOUS
Status: DISCONTINUED | OUTPATIENT
Start: 2023-03-27 | End: 2023-03-28

## 2023-03-27 RX ORDER — CEFAZOLIN SODIUM/WATER 2 G/20 ML
2 SYRINGE (ML) INTRAVENOUS SEE ADMIN INSTRUCTIONS
Status: DISCONTINUED | OUTPATIENT
Start: 2023-03-27 | End: 2023-03-27 | Stop reason: HOSPADM

## 2023-03-27 RX ORDER — LIDOCAINE 40 MG/G
CREAM TOPICAL
Status: DISCONTINUED | OUTPATIENT
Start: 2023-03-27 | End: 2023-03-28 | Stop reason: HOSPADM

## 2023-03-27 RX ORDER — NALOXONE HYDROCHLORIDE 0.4 MG/ML
0.2 INJECTION, SOLUTION INTRAMUSCULAR; INTRAVENOUS; SUBCUTANEOUS
Status: DISCONTINUED | OUTPATIENT
Start: 2023-03-27 | End: 2023-03-27 | Stop reason: HOSPADM

## 2023-03-27 RX ORDER — NALOXONE HYDROCHLORIDE 0.4 MG/ML
0.4 INJECTION, SOLUTION INTRAMUSCULAR; INTRAVENOUS; SUBCUTANEOUS
Status: DISCONTINUED | OUTPATIENT
Start: 2023-03-27 | End: 2023-03-27 | Stop reason: HOSPADM

## 2023-03-27 RX ORDER — BUPIVACAINE HYDROCHLORIDE 2.5 MG/ML
INJECTION, SOLUTION EPIDURAL; INFILTRATION; INTRACAUDAL
Status: COMPLETED | OUTPATIENT
Start: 2023-03-27 | End: 2023-03-27

## 2023-03-27 RX ORDER — CEFAZOLIN SODIUM/WATER 2 G/20 ML
2 SYRINGE (ML) INTRAVENOUS
Status: COMPLETED | OUTPATIENT
Start: 2023-03-27 | End: 2023-03-27

## 2023-03-27 RX ORDER — OXYCODONE HYDROCHLORIDE 10 MG/1
10 TABLET ORAL EVERY 4 HOURS PRN
Status: DISCONTINUED | OUTPATIENT
Start: 2023-03-27 | End: 2023-03-28 | Stop reason: HOSPADM

## 2023-03-27 RX ORDER — HYDROMORPHONE HYDROCHLORIDE 1 MG/ML
0.4 INJECTION, SOLUTION INTRAMUSCULAR; INTRAVENOUS; SUBCUTANEOUS EVERY 5 MIN PRN
Status: DISCONTINUED | OUTPATIENT
Start: 2023-03-27 | End: 2023-03-27 | Stop reason: HOSPADM

## 2023-03-27 RX ORDER — EPHEDRINE SULFATE 50 MG/ML
INJECTION, SOLUTION INTRAMUSCULAR; INTRAVENOUS; SUBCUTANEOUS PRN
Status: DISCONTINUED | OUTPATIENT
Start: 2023-03-27 | End: 2023-03-27

## 2023-03-27 RX ORDER — DEXAMETHASONE SODIUM PHOSPHATE 10 MG/ML
INJECTION, SOLUTION INTRAMUSCULAR; INTRAVENOUS
Status: COMPLETED | OUTPATIENT
Start: 2023-03-27 | End: 2023-03-27

## 2023-03-27 RX ORDER — ONDANSETRON 4 MG/1
4 TABLET, ORALLY DISINTEGRATING ORAL EVERY 30 MIN PRN
Status: DISCONTINUED | OUTPATIENT
Start: 2023-03-27 | End: 2023-03-27 | Stop reason: HOSPADM

## 2023-03-27 RX ORDER — ACETAMINOPHEN 325 MG/1
975 TABLET ORAL EVERY 8 HOURS
Status: DISCONTINUED | OUTPATIENT
Start: 2023-03-27 | End: 2023-03-28 | Stop reason: HOSPADM

## 2023-03-27 RX ORDER — PROCHLORPERAZINE MALEATE 5 MG
5 TABLET ORAL EVERY 6 HOURS PRN
Status: DISCONTINUED | OUTPATIENT
Start: 2023-03-27 | End: 2023-03-28 | Stop reason: HOSPADM

## 2023-03-27 RX ORDER — FENTANYL CITRATE 50 UG/ML
25-50 INJECTION, SOLUTION INTRAMUSCULAR; INTRAVENOUS
Status: DISCONTINUED | OUTPATIENT
Start: 2023-03-27 | End: 2023-03-27 | Stop reason: HOSPADM

## 2023-03-27 RX ORDER — HYDROMORPHONE HCL IN WATER/PF 6 MG/30 ML
0.2 PATIENT CONTROLLED ANALGESIA SYRINGE INTRAVENOUS
Status: DISCONTINUED | OUTPATIENT
Start: 2023-03-27 | End: 2023-03-28

## 2023-03-27 RX ORDER — ONDANSETRON 2 MG/ML
4 INJECTION INTRAMUSCULAR; INTRAVENOUS EVERY 30 MIN PRN
Status: DISCONTINUED | OUTPATIENT
Start: 2023-03-27 | End: 2023-03-27 | Stop reason: HOSPADM

## 2023-03-27 RX ORDER — ACETAMINOPHEN 325 MG/1
650 TABLET ORAL EVERY 4 HOURS PRN
Status: DISCONTINUED | OUTPATIENT
Start: 2023-03-30 | End: 2023-03-28 | Stop reason: HOSPADM

## 2023-03-27 RX ORDER — ZOLPIDEM TARTRATE 5 MG/1
10 TABLET ORAL AT BEDTIME
Status: DISCONTINUED | OUTPATIENT
Start: 2023-03-27 | End: 2023-03-28 | Stop reason: HOSPADM

## 2023-03-27 RX ORDER — FLUMAZENIL 0.1 MG/ML
0.2 INJECTION, SOLUTION INTRAVENOUS
Status: DISCONTINUED | OUTPATIENT
Start: 2023-03-27 | End: 2023-03-27 | Stop reason: HOSPADM

## 2023-03-27 RX ORDER — PROPOFOL 10 MG/ML
INJECTION, EMULSION INTRAVENOUS PRN
Status: DISCONTINUED | OUTPATIENT
Start: 2023-03-27 | End: 2023-03-27

## 2023-03-27 RX ORDER — HYDROMORPHONE HYDROCHLORIDE 1 MG/ML
0.2 INJECTION, SOLUTION INTRAMUSCULAR; INTRAVENOUS; SUBCUTANEOUS EVERY 5 MIN PRN
Status: DISCONTINUED | OUTPATIENT
Start: 2023-03-27 | End: 2023-03-27 | Stop reason: HOSPADM

## 2023-03-27 RX ORDER — PREGABALIN 25 MG/1
25 CAPSULE ORAL DAILY PRN
COMMUNITY

## 2023-03-27 RX ORDER — OXYCODONE HYDROCHLORIDE 5 MG/1
5 TABLET ORAL EVERY 4 HOURS PRN
Status: DISCONTINUED | OUTPATIENT
Start: 2023-03-27 | End: 2023-03-28 | Stop reason: HOSPADM

## 2023-03-27 RX ORDER — ZOLPIDEM TARTRATE 5 MG/1
10 TABLET ORAL AT BEDTIME
Status: DISCONTINUED | OUTPATIENT
Start: 2023-03-27 | End: 2023-03-27

## 2023-03-27 RX ORDER — MAGNESIUM HYDROXIDE 1200 MG/15ML
LIQUID ORAL PRN
Status: DISCONTINUED | OUTPATIENT
Start: 2023-03-27 | End: 2023-03-27 | Stop reason: HOSPADM

## 2023-03-27 RX ORDER — CEFAZOLIN SODIUM 2 G/100ML
2 INJECTION, SOLUTION INTRAVENOUS EVERY 8 HOURS
Status: COMPLETED | OUTPATIENT
Start: 2023-03-27 | End: 2023-03-28

## 2023-03-27 RX ORDER — FENTANYL CITRATE 50 UG/ML
25 INJECTION, SOLUTION INTRAMUSCULAR; INTRAVENOUS EVERY 5 MIN PRN
Status: DISCONTINUED | OUTPATIENT
Start: 2023-03-27 | End: 2023-03-27 | Stop reason: HOSPADM

## 2023-03-27 RX ORDER — DEXMEDETOMIDINE HYDROCHLORIDE 4 UG/ML
INJECTION, SOLUTION INTRAVENOUS
Status: COMPLETED | OUTPATIENT
Start: 2023-03-27 | End: 2023-03-27

## 2023-03-27 RX ORDER — ALBUTEROL SULFATE 90 UG/1
2 AEROSOL, METERED RESPIRATORY (INHALATION) EVERY 6 HOURS PRN
Status: DISCONTINUED | OUTPATIENT
Start: 2023-03-27 | End: 2023-03-28 | Stop reason: HOSPADM

## 2023-03-27 RX ORDER — NALOXONE HYDROCHLORIDE 0.4 MG/ML
0.2 INJECTION, SOLUTION INTRAMUSCULAR; INTRAVENOUS; SUBCUTANEOUS
Status: DISCONTINUED | OUTPATIENT
Start: 2023-03-27 | End: 2023-03-28 | Stop reason: HOSPADM

## 2023-03-27 RX ORDER — AMOXICILLIN 250 MG
1 CAPSULE ORAL 2 TIMES DAILY
Status: DISCONTINUED | OUTPATIENT
Start: 2023-03-27 | End: 2023-03-28 | Stop reason: HOSPADM

## 2023-03-27 RX ORDER — SODIUM CHLORIDE, SODIUM LACTATE, POTASSIUM CHLORIDE, CALCIUM CHLORIDE 600; 310; 30; 20 MG/100ML; MG/100ML; MG/100ML; MG/100ML
INJECTION, SOLUTION INTRAVENOUS CONTINUOUS PRN
Status: DISCONTINUED | OUTPATIENT
Start: 2023-03-27 | End: 2023-03-27

## 2023-03-27 RX ORDER — SODIUM CHLORIDE, SODIUM LACTATE, POTASSIUM CHLORIDE, CALCIUM CHLORIDE 600; 310; 30; 20 MG/100ML; MG/100ML; MG/100ML; MG/100ML
INJECTION, SOLUTION INTRAVENOUS CONTINUOUS
Status: DISCONTINUED | OUTPATIENT
Start: 2023-03-27 | End: 2023-03-27 | Stop reason: HOSPADM

## 2023-03-27 RX ORDER — TRANEXAMIC ACID 650 MG/1
1950 TABLET ORAL ONCE
Status: COMPLETED | OUTPATIENT
Start: 2023-03-27 | End: 2023-03-27

## 2023-03-27 RX ORDER — FENTANYL CITRATE 50 UG/ML
INJECTION, SOLUTION INTRAMUSCULAR; INTRAVENOUS PRN
Status: DISCONTINUED | OUTPATIENT
Start: 2023-03-27 | End: 2023-03-27

## 2023-03-27 RX ORDER — LORAZEPAM 0.5 MG/1
0.5 TABLET ORAL DAILY PRN
Status: DISCONTINUED | OUTPATIENT
Start: 2023-03-27 | End: 2023-03-28 | Stop reason: HOSPADM

## 2023-03-27 RX ORDER — BISACODYL 10 MG
10 SUPPOSITORY, RECTAL RECTAL DAILY PRN
Status: DISCONTINUED | OUTPATIENT
Start: 2023-03-27 | End: 2023-03-28 | Stop reason: HOSPADM

## 2023-03-27 RX ORDER — LIDOCAINE HYDROCHLORIDE 20 MG/ML
INJECTION, SOLUTION INFILTRATION; PERINEURAL PRN
Status: DISCONTINUED | OUTPATIENT
Start: 2023-03-27 | End: 2023-03-27

## 2023-03-27 RX ORDER — ASPIRIN 81 MG/1
81 TABLET ORAL 2 TIMES DAILY
Status: DISCONTINUED | OUTPATIENT
Start: 2023-03-28 | End: 2023-03-28 | Stop reason: HOSPADM

## 2023-03-27 RX ORDER — TIZANIDINE 2 MG/1
2 TABLET ORAL EVERY 6 HOURS PRN
Status: DISCONTINUED | OUTPATIENT
Start: 2023-03-27 | End: 2023-03-28

## 2023-03-27 RX ORDER — PREGABALIN 75 MG/1
225 CAPSULE ORAL AT BEDTIME
Status: DISCONTINUED | OUTPATIENT
Start: 2023-03-27 | End: 2023-03-28 | Stop reason: HOSPADM

## 2023-03-27 RX ORDER — PREGABALIN 50 MG/1
50 CAPSULE ORAL
Status: DISCONTINUED | OUTPATIENT
Start: 2023-03-27 | End: 2023-03-27

## 2023-03-27 RX ORDER — SODIUM CHLORIDE, SODIUM LACTATE, POTASSIUM CHLORIDE, CALCIUM CHLORIDE 600; 310; 30; 20 MG/100ML; MG/100ML; MG/100ML; MG/100ML
INJECTION, SOLUTION INTRAVENOUS CONTINUOUS
Status: DISCONTINUED | OUTPATIENT
Start: 2023-03-27 | End: 2023-03-28

## 2023-03-27 RX ORDER — DEXAMETHASONE SODIUM PHOSPHATE 4 MG/ML
INJECTION, SOLUTION INTRA-ARTICULAR; INTRALESIONAL; INTRAMUSCULAR; INTRAVENOUS; SOFT TISSUE PRN
Status: DISCONTINUED | OUTPATIENT
Start: 2023-03-27 | End: 2023-03-27

## 2023-03-27 RX ORDER — ONDANSETRON 2 MG/ML
INJECTION INTRAMUSCULAR; INTRAVENOUS PRN
Status: DISCONTINUED | OUTPATIENT
Start: 2023-03-27 | End: 2023-03-27

## 2023-03-27 RX ADMIN — FENTANYL CITRATE 50 MCG: 50 INJECTION INTRAMUSCULAR; INTRAVENOUS at 10:35

## 2023-03-27 RX ADMIN — HYDROMORPHONE HYDROCHLORIDE 0.4 MG: 1 INJECTION, SOLUTION INTRAMUSCULAR; INTRAVENOUS; SUBCUTANEOUS at 11:01

## 2023-03-27 RX ADMIN — PHENYLEPHRINE HYDROCHLORIDE 50 MCG: 10 INJECTION INTRAVENOUS at 08:00

## 2023-03-27 RX ADMIN — PHENYLEPHRINE HYDROCHLORIDE 50 MCG: 10 INJECTION INTRAVENOUS at 07:55

## 2023-03-27 RX ADMIN — ACETAMINOPHEN 975 MG: 325 TABLET ORAL at 11:18

## 2023-03-27 RX ADMIN — PROPOFOL 20 MG: 10 INJECTION, EMULSION INTRAVENOUS at 08:18

## 2023-03-27 RX ADMIN — PROPOFOL 30 MCG/KG/MIN: 10 INJECTION, EMULSION INTRAVENOUS at 08:04

## 2023-03-27 RX ADMIN — FENTANYL CITRATE 25 MCG: 50 INJECTION, SOLUTION INTRAMUSCULAR; INTRAVENOUS at 07:12

## 2023-03-27 RX ADMIN — TRANEXAMIC ACID 1950 MG: 650 TABLET ORAL at 06:07

## 2023-03-27 RX ADMIN — BUPIVACAINE HYDROCHLORIDE 20 ML: 2.5 INJECTION, SOLUTION EPIDURAL; INFILTRATION; INTRACAUDAL at 06:57

## 2023-03-27 RX ADMIN — SODIUM CHLORIDE, POTASSIUM CHLORIDE, SODIUM LACTATE AND CALCIUM CHLORIDE: 600; 310; 30; 20 INJECTION, SOLUTION INTRAVENOUS at 09:57

## 2023-03-27 RX ADMIN — HYDROMORPHONE HYDROCHLORIDE 0.2 MG: 1 INJECTION, SOLUTION INTRAMUSCULAR; INTRAVENOUS; SUBCUTANEOUS at 11:24

## 2023-03-27 RX ADMIN — ONDANSETRON 4 MG: 2 INJECTION INTRAMUSCULAR; INTRAVENOUS at 09:50

## 2023-03-27 RX ADMIN — ONDANSETRON 4 MG: 2 INJECTION INTRAMUSCULAR; INTRAVENOUS at 10:28

## 2023-03-27 RX ADMIN — ACETAMINOPHEN 650 MG: 325 TABLET ORAL at 18:39

## 2023-03-27 RX ADMIN — GLYCOPYRROLATE 0.2 MG: 0.2 INJECTION, SOLUTION INTRAMUSCULAR; INTRAVENOUS at 08:30

## 2023-03-27 RX ADMIN — FENTANYL CITRATE 50 MCG: 50 INJECTION, SOLUTION INTRAMUSCULAR; INTRAVENOUS at 10:23

## 2023-03-27 RX ADMIN — Medication 20 MCG: at 06:57

## 2023-03-27 RX ADMIN — DEXAMETHASONE SODIUM PHOSPHATE 6 MG: 4 INJECTION, SOLUTION INTRA-ARTICULAR; INTRALESIONAL; INTRAMUSCULAR; INTRAVENOUS; SOFT TISSUE at 07:49

## 2023-03-27 RX ADMIN — PHENYLEPHRINE HYDROCHLORIDE 0.2 MCG/KG/MIN: 10 INJECTION INTRAVENOUS at 08:15

## 2023-03-27 RX ADMIN — Medication 10 MG: at 08:30

## 2023-03-27 RX ADMIN — Medication 2 G: at 07:55

## 2023-03-27 RX ADMIN — NORTRIPTYLINE HYDROCHLORIDE 40 MG: 10 CAPSULE ORAL at 21:50

## 2023-03-27 RX ADMIN — SODIUM CHLORIDE, POTASSIUM CHLORIDE, SODIUM LACTATE AND CALCIUM CHLORIDE: 600; 310; 30; 20 INJECTION, SOLUTION INTRAVENOUS at 12:28

## 2023-03-27 RX ADMIN — ZOLPIDEM TARTRATE 10 MG: 5 TABLET ORAL at 21:50

## 2023-03-27 RX ADMIN — DEXAMETHASONE SODIUM PHOSPHATE 2 MG: 10 INJECTION, SOLUTION INTRAMUSCULAR; INTRAVENOUS at 06:57

## 2023-03-27 RX ADMIN — FENTANYL CITRATE 25 MCG: 50 INJECTION, SOLUTION INTRAMUSCULAR; INTRAVENOUS at 07:49

## 2023-03-27 RX ADMIN — PHENYLEPHRINE HYDROCHLORIDE 50 MCG: 10 INJECTION INTRAVENOUS at 08:04

## 2023-03-27 RX ADMIN — FENTANYL CITRATE 25 MCG: 50 INJECTION, SOLUTION INTRAMUSCULAR; INTRAVENOUS at 08:43

## 2023-03-27 RX ADMIN — PROPOFOL 150 MG: 10 INJECTION, EMULSION INTRAVENOUS at 07:49

## 2023-03-27 RX ADMIN — LIDOCAINE HYDROCHLORIDE 40 MG: 20 INJECTION, SOLUTION INFILTRATION; PERINEURAL at 07:49

## 2023-03-27 RX ADMIN — PROPOFOL 20 MG: 10 INJECTION, EMULSION INTRAVENOUS at 08:20

## 2023-03-27 RX ADMIN — PREGABALIN 225 MG: 75 CAPSULE ORAL at 21:50

## 2023-03-27 RX ADMIN — HYDROMORPHONE HYDROCHLORIDE 0.4 MG: 1 INJECTION, SOLUTION INTRAMUSCULAR; INTRAVENOUS; SUBCUTANEOUS at 11:14

## 2023-03-27 RX ADMIN — CEFAZOLIN SODIUM 2 G: 2 INJECTION, SOLUTION INTRAVENOUS at 16:16

## 2023-03-27 RX ADMIN — OXYCODONE HYDROCHLORIDE 5 MG: 5 TABLET ORAL at 18:39

## 2023-03-27 RX ADMIN — Medication 5 MG: at 08:00

## 2023-03-27 RX ADMIN — SODIUM CHLORIDE, POTASSIUM CHLORIDE, SODIUM LACTATE AND CALCIUM CHLORIDE: 600; 310; 30; 20 INJECTION, SOLUTION INTRAVENOUS at 21:53

## 2023-03-27 RX ADMIN — PROPOFOL 50 MG: 10 INJECTION, EMULSION INTRAVENOUS at 07:51

## 2023-03-27 RX ADMIN — OXYCODONE HYDROCHLORIDE 5 MG: 5 TABLET ORAL at 13:55

## 2023-03-27 RX ADMIN — SODIUM CHLORIDE, POTASSIUM CHLORIDE, SODIUM LACTATE AND CALCIUM CHLORIDE: 600; 310; 30; 20 INJECTION, SOLUTION INTRAVENOUS at 07:42

## 2023-03-27 RX ADMIN — Medication 10 MG: at 08:04

## 2023-03-27 RX ADMIN — FENTANYL CITRATE 50 MCG: 50 INJECTION INTRAMUSCULAR; INTRAVENOUS at 10:49

## 2023-03-27 ASSESSMENT — ACTIVITIES OF DAILY LIVING (ADL)
ADLS_ACUITY_SCORE: 26
ADLS_ACUITY_SCORE: 22

## 2023-03-27 NOTE — ANESTHESIA CARE TRANSFER NOTE
Patient: Delmis Parrish    Procedure: Procedure(s):  ARTHROPLASTY, KNEE, TOTAL LEFT       Diagnosis: Primary osteoarthritis of left knee [M17.12]  Diagnosis Additional Information: No value filed.    Anesthesia Type:   General     Note:    Oropharynx: oropharynx clear of all foreign objects and spontaneously breathing  Level of Consciousness: awake  Oxygen Supplementation: face mask  Level of Supplemental Oxygen (L/min / FiO2): 6  Independent Airway: airway patency satisfactory and stable  Dentition: dentition unchanged  Vital Signs Stable: post-procedure vital signs reviewed and stable  Report to RN Given: handoff report given  Patient transferred to: PACU  Comments: To PACU with 02, Spont RR. Monitors applied, VSS, PIV/airway patent, Report to RN all questions/concerns answered.     Handoff Report: Identifed the Patient, Identified the Reponsible Provider, Reviewed the pertinent medical history, Discussed the surgical course, Reviewed Intra-OP anesthesia mangement and issues during anesthesia, Set expectations for post-procedure period and Allowed opportunity for questions and acknowledgement of understanding      Vitals:  Vitals Value Taken Time   /81 03/27/23 1021   Temp 36.5    Pulse 73 03/27/23 1023   Resp 16    SpO2 96 % 03/27/23 1023   Vitals shown include unvalidated device data.    Electronically Signed By: KRYSTAL Rendon CRNA  March 27, 2023  10:24 AM

## 2023-03-27 NOTE — OR NURSING
PACU to Inpatient Nursing Handoff    Patient Delmis Parrish is a 72 year old female who speaks English.   Procedure Procedure(s):  ARTHROPLASTY, KNEE, TOTAL LEFT   Surgeon(s) Primary: Tee Espinoza MD  Resident - Assisting: Kalani Betts MD     Allergies   Allergen Reactions     Adhesive Tape      Paroxetine Nausea and Vomiting     aka paxil       Sertraline Nausea and Vomiting     aka zoloft       Tartrazine Nausea and Vomiting     aka darvon         Isolation  [unfilled]     Past Medical History   has a past medical history of Gastroesophageal reflux disease, Generalized anxiety disorder, Insomnia, LVH (left ventricular hypertrophy), Major depressive disorder, recurrent episode, mild (H), and Renovascular hypertension.    Anesthesia General with Adductor Block   Dermatome Level     Preop Meds fentanyl 25 mcg and txa 1950 mg - time given: 0712 and 0607   Nerve block Adductor canal.  Location:left. Med:bupivicaine, precedex, decadron. Time given: 0709   Intraop Meds fentanyl (Sublimaze): 100 mcg total  hydromorphone (Dilaudid): 1 mg total  ondansetron (Zofran): last given at 1029  tylenol 975 mg at 1120   Local Meds No   Antibiotics cefazolin (Ancef) - last given at 0755     Pain Patient Currently in Pain: yes   PACU meds  acetaminophen (Tylenol): 975 mg (total dose) last given at 1120   fentanyl (Sublimaze): 100 mcg (total dose) last given at 1050   hydromorphone (Dilaudid): 1 mg (total dose) last given at 1125   ondansetron (Zofran): 4 mg (total dose) last given at 1029    PCA / epidural No   Capnography     Telemetry ECG Rhythm:  (right bundle branch block)   Inpatient Telemetry Monitor Ordered? No        Labs Glucose Lab Results   Component Value Date    GLC 90 03/27/2023    GLC 94 07/23/2007       Hgb Lab Results   Component Value Date    HGB 14.4 03/27/2023    HGB 10.4 07/24/2007       INR Lab Results   Component Value Date    INR 1.01 07/15/2022    INR 0.96 07/23/2007      PACU Imaging  Completed     Wound/Incision Incision/Surgical Site 08/08/22 Left Leg (Active)   Number of days: 231       Incision/Surgical Site 03/27/23 Left Knee (Active)   Incision Assessment UTV 03/27/23 1021   Closure ROBSON 03/27/23 1021   Incision Drainage Amount None 03/27/23 1021   Dressing Intervention Clean, dry, intact 03/27/23 1021   Number of days: 0      CMS        Equipment ice pack   Other LDA       IV Access Peripheral IV 03/27/23 Left Hand (Active)   Site Assessment WDL 03/27/23 1021   Line Status Infusing 03/27/23 1021   Phlebitis Scale 0-->no symptoms 03/27/23 1021   Infiltration Scale 0 03/27/23 0646   Number of days: 0      Blood Products Not applicable  mL   Intake/Output Date 03/27/23 0700 - 03/28/23 0659   Shift 1445-4755 3174-3503 8906-0922 24 Hour Total   INTAKE   I.V. 1000   1000   Shift Total(mL/kg) 1000(11.88)   1000(11.88)   OUTPUT   Shift Total(mL/kg)       Weight (kg) 84.2 84.2 84.2 84.2      Drains / Kang Closed/Suction Drain 1 Left;Proximal;Anterior Leg Accordion 10 Sinhala (Active)   Site Description UTV 03/27/23 1021   Dressing Status Normal: Clean, Dry & Intact 03/27/23 1021   Drainage Appearance Bloody/Bright Red 03/27/23 1021   Status To bulb suction 03/27/23 1021   Number of days: 0      Time of void PreOp Void Prior to Procedure: 0647 (03/27/23 0647)    PostOp      Diapered? No   Bladder Scan     PO    tolerating sips     Vitals    B/P: 110/69  T: 97.7  F (36.5  C)    Temp src: Axillary  P:  Pulse: 67 (03/27/23 1115)          R: 11  O2:  SpO2: 96 %    O2 Device: Nasal cannula (03/27/23 1115)    Oxygen Delivery: 3 LPM (03/27/23 1115)         Family/support present significant other   Patient belongings     Patient transported on cart and air mat   DC meds/scripts (obs/outpt) Not applicable   Inpatient Pain Meds Released? Yes       Special needs/considerations None   Tasks needing completion None       Imelda Villegas, RN  [facility] [id number]

## 2023-03-27 NOTE — ANESTHESIA PREPROCEDURE EVALUATION
Anesthesia Pre-Procedure Evaluation    Patient: Delmis Parrish   MRN: [id number] : 1950        Procedure : Procedure(s):  ARTHROPLASTY, KNEE, TOTAL LEFT          Past Medical History:   Diagnosis Date     Gastroesophageal reflux disease      Generalized anxiety disorder      Insomnia      LVH (left ventricular hypertrophy)      Major depressive disorder, recurrent episode, mild (H)      Renovascular hypertension       Past Surgical History:   Procedure Laterality Date     BIOPSY BONE LOWER EXTREMITY Left 2022    Procedure: Biopsy Left Tibia;  Surgeon: Tee Espinoza MD;  [facility]     CHOLECYSTECTOMY       CURETTAGE AND CEMENTATION LOWER EXTREMITY, COMBINED Left 2022    Procedure: With Curettage and Allograft Placement;  Surgeon: Tee Espinoza MD;  [facility] OR     EXC TUMR SOFT TISSUE BACK/FLANK        FLUOROGUIDE FOR SPINE INJECT   2008     HYSTERECTOMY        Allergies   Allergen Reactions     Adhesive Tape      Paroxetine Nausea and Vomiting     aka paxil       Sertraline Nausea and Vomiting     aka zoloft       Tartrazine Nausea and Vomiting     aka darvon        Social History     Tobacco Use     Smoking status: Never     Smokeless tobacco: Never   Substance Use Topics     Alcohol use: Not Currently      Wt Readings from Last 1 Encounters:   23 85.7 kg (189 lb)        Anesthesia Evaluation            ROS/MED HX  ENT/Pulmonary: Comment: Bronchiectasis      Neurologic: Comment: Spinal cord stimulator    (+) peripheral neuropathy, - Alcohol-induced.     Cardiovascular:     (+) hypertension-----    METS/Exercise Tolerance:  Comment: LVH, Mitral Valve Disease   Hematologic:       Musculoskeletal:       GI/Hepatic:     (+) GERD,     Renal/Genitourinary:       Endo:       Psychiatric/Substance Use:     (+) psychiatric history depression and anxiety alcohol abuse     Infectious Disease:       Malignancy:       Other:               OUTSIDE LABS:  CBC:   Lab  Results   Component Value Date    WBC 7.5 07/15/2022    HGB 14.1 07/15/2022    HGB 10.4 (L) 07/24/2007    HCT 42.8 07/15/2022     07/15/2022     BMP:   Lab Results   Component Value Date     07/23/2007    POTASSIUM 3.8 07/23/2007    CHLORIDE 103 07/23/2007    CO2 25 07/23/2007    BUN 15 07/23/2007    CR 0.91 07/23/2007    GLC 98 08/08/2022    GLC 94 07/23/2007     COAGS:   Lab Results   Component Value Date    PTT 25 07/23/2007    INR 1.01 07/15/2022     POC: No results found for: BGM, HCG, HCGS  HEPATIC: No results found for: ALBUMIN, PROTTOTAL, ALT, AST, GGT, ALKPHOS, BILITOTAL, BILIDIRECT, DELICIA  OTHER:   Lab Results   Component Value Date    VETO 8.6 07/23/2007       Anesthesia Plan    ASA Status:  3      Anesthesia Type: General.     - Airway: LMA   Induction: Intravenous.   Maintenance: TIVA.        Consents    Anesthesia Plan(s) and associated risks, benefits, and realistic alternatives discussed. Questions answered and patient/representative(s) expressed understanding.    - Discussed:     - Discussed with:  Patient         Postoperative Care       PONV prophylaxis: Background Propofol Infusion, Ondansetron (or other 5HT-3), Dexamethasone or Solumedrol     Comments:                Mel Diaz MD

## 2023-03-27 NOTE — PHARMACY-ADMISSION MEDICATION HISTORY
Admission Medication History Completed by Pharmacy    See [facility] Admission Navigator for allergy information, preferred outpatient pharmacy, prior to admission medications and immunization status.     Medication History Sources:     Patient, Pharmacy Dispense Report    Changes made to PTA medication list (reason):    Added: pregabalin 25 mg and 100 mg    Deleted: dicyclomine capsule.    Changed: fish oil dosing.    Additional Information:    Patient reported taking 2 capsules of 100 mg pregabalin together with 1 capsule of 25 mg pregabalin before bed daily. Total dose: 225 mg of pregabalin.     Patient confirmed taking 1 tablet of amlodipine 5 mg TWICE a day.    Fish oil dose was changed from 2 capsules to 1 capsule once a day per patient reported.    Patient has stopped taking dicyclomine since last year.     Prior to Admission medications    Medication Sig Last Dose Taking? Auth Provider Long Term End Date   acetaminophen (TYLENOL) 325 MG tablet Take 2 tablets (650 mg) by mouth every 4 hours as needed for mild pain 3/27/2023 at AM Yes Cheryl Ayala PA-C     albuterol (PROAIR HFA/PROVENTIL HFA/VENTOLIN HFA) 108 (90 Base) MCG/ACT inhaler INHALE 2 PUFFS BY MOUTH EVERY 6 HOURS AS NEEDED FOR WHEEZE 3/26/2023 Yes Reported, Patient Yes    amLODIPine (NORVASC) 5 MG tablet Take 1 tablet by mouth 2 times daily 3/27/2023 at 0400 Yes Reported, Patient Yes    aspirin 81 MG EC tablet Take 81 mg by mouth daily Past Week Yes Reported, Patient     celecoxib (CELEBREX) 50 MG capsule Take  mg by mouth daily as needed Past Week Yes Reported, Patient Yes    cholecalciferol (VITAMIN D3) 25 mcg (1000 units) capsule Take 1 capsule by mouth daily Past Week Yes Reported, Patient     estradiol (VAGIFEM) 10 MCG TABS vaginal tablet Place 10 mcg vaginally twice a week 3/25/2023 Yes Reported, Patient     fish oil-omega-3 fatty acids 1000 MG capsule Take 1 capsule by mouth Past Week Yes Reported, Patient     FLUoxetine  (PROZAC) 10 MG tablet Take 15 mg by mouth At Bedtime 3/26/2023 at 1800 Yes Reported, Patient Yes    GABAPENTIN 8%/VANICREAM (FV COMPOUNDED) CREAM Apply 1-2 g topically 2 times daily Apply topically to feet 3/25/2023 Yes Reported, Patient Yes    glucosamine-chondroitin 500-400 MG CAPS per capsule daily Past Week Yes Reported, Patient     LANsoprazole (PREVACID) 30 MG DR capsule TAKE 1 CAPSULE BY MOUTH EVERY DAY 3/26/2023 at 2200 Yes Reported, Patient     LORazepam (ATIVAN) 0.5 MG tablet TAKE 1 TABLET BY MOUTH EVERY DAY AS NEEDED FOR ANXIETY 3/26/2023 Yes Reported, Patient     nortriptyline (PAMELOR) 10 MG capsule TAKE 4 CAPSULES BY MOUTH DAILY AT BEDTIME. 3/26/2023 at 1800 Yes Reported, Patient Yes    ondansetron (ZOFRAN ODT) 4 MG ODT tab Take 4 mg by mouth as needed Past Week Yes Reported, Patient     oxyCODONE (ROXICODONE) 5 MG tablet Take 1-2 tablets (5-10 mg) by mouth every 4 hours as needed for moderate to severe pain Past Month Yes Cheryl Ayala PA-C     pregabalin (LYRICA) 100 MG capsule Take 200 mg by mouth daily at bedtime 3/26/2023 at 2200 Yes Reported, Patient Yes    pregabalin (LYRICA) 25 MG capsule Take 25 mg by mouth daily at bedtime 3/26/2023 at pm Yes Reported, Patient Yes    senna-docusate (SENOKOT-S/PERICOLACE) 8.6-50 MG tablet Take 1-2 tablets by mouth 2 times daily  Yes Cheryl Ayala PA-C     zolpidem (AMBIEN) 10 MG tablet TAKE 1 TABLET BY MOUTH EVERYDAY AT BEDTIME 3/26/2023 at 2200 Yes Reported, Patient         Date completed: 03/27/23    Medication history completed by: Tanvi Cook

## 2023-03-27 NOTE — ANESTHESIA PROCEDURE NOTES
Airway       Patient location during procedure: OR  Staff -        Anesthesiologist:  Mel Diaz MD       CRNA: Zoraida Bose APRN CRNA       Other Anesthesia Staff: Salomón Ruiz       Performed By: SRNA and with CRNAs       Procedure performed by resident/fellow/CRNA in presence of a teaching physician.    Consent for Airway        Urgency: elective  Indications and Patient Condition       Indications for airway management: damien-procedural       Induction type:intravenous       Mask difficulty assessment: 0 - not attempted    Final Airway Details       Final airway type: supraglottic airway    Supraglottic Airway Details        Type: LMA       Brand: Air-Q       LMA size: 3.5    Post intubation assessment        Placement verified by: capnometry, equal breath sounds and chest rise        Number of attempts at approach: 1       Number of other approaches attempted: 0       Secured with: silk tape       Ease of procedure: easy       Dentition: Intact and Unchanged

## 2023-03-27 NOTE — PROGRESS NOTES
"  VS: /76   Pulse 68   Temp 97  F (36.1  C) (Axillary)   Resp 10   Ht 1.702 m (5' 7.01\")   Wt 84.2 kg (185 lb 10 oz)   SpO2 95%   BMI 29.07 kg/m     O2: 3L   Output: Voids in toilet   Last BM:    Activity: SBA with gait belt and walker   Skin: WNL   Pain: 5 mg oxy or IV dilaudid   CMS: No numb/tingling +2 pulses   Dressing: CDI   Diet: reg   LDA: PIV in L hand   Equipment: Gait belt, walker   Plan: Discharge home tomorrow with    Additional Info:      Worked with PT today, voiding, pain is well controlled.  Tolerating diet, no signs of N&V.    "

## 2023-03-27 NOTE — ANESTHESIA PROCEDURE NOTES
Adductor canal Procedure Note    Pre-Procedure   Staff -        Anesthesiologist:  Mel Diaz MD       Resident/Fellow: Herber Swartz MD       Performed By: resident       Procedure Start/Stop Times: 3/27/2023 6:57 AM and 3/27/2023 7:09 AM       Pre-Anesthestic Checklist: patient identified, IV checked, site marked, risks and benefits discussed, informed consent, monitors and equipment checked, pre-op evaluation, at physician/surgeon's request and post-op pain management  Timeout:       Correct Patient: Yes        Correct Procedure: Yes        Correct Site: Yes        Correct Position: Yes        Correct Laterality: Yes        Site Marked: Yes  Procedure Documentation  Procedure: Adductor canal       Laterality: left       Patient Position: supine       Skin prep: Chloraprep       Needle Type: insulated       Needle Gauge: 21.        Needle Length (millimeters): 110        Ultrasound guided       1. Ultrasound was used to identify targeted nerve, plexus, vascular marker, or fascial plane and place a needle adjacent to it in real-time.       2. Ultrasound was used to visualize the spread of anesthetic in close proximity to the above referenced structure.       3. A permanent image is entered into the patient's record.    Assessment/Narrative         The placement was negative for: blood aspirated, painful injection and site bleeding       Paresthesias: No.       Bolus given via needle..        Secured via.        Insertion/Infusion Method: Single Shot       Complications: none    Medication(s) Administered   Bupivacaine 0.25% PF (Infiltration) - Infiltration   20 mL - 3/27/2023 6:57:00 AM  Dexmedetomidine 4 mcg/mL (Perineural) - Perineural   20 mcg - 3/27/2023 6:57:00 AM  Dexamethasone 10 mg/mL PF (Perineural) - Perineural   2 mg - 3/27/2023 6:57:00 AM  Medication Administration Time: 3/27/2023 6:57 AM      FOR [facility] ([facility] [place] ONLY:   Pain Team Contact information: please page the Pain Team  "[email or web address] [email or web address]. Search \"Pain\". During daytime hours, please page the attending first. At night please page the resident first.    "

## 2023-03-27 NOTE — CONSULTS
"[facility] [place]  Consult Note - Hospitalist Service, GOLD TEAM 20  Date of Admission:  3/27/2023  Consult Requested by: Dr. Tee Espinoza   Reason for Consult: Medical Co-Management     Assessment & Plan   Delmis Parrish is a 72 year old female with a past medical history of bronchiectasis, GERD, KIMBERLEY, and HTN who presents for her scheduled left TKA with Dr. Espinoza.  Internal Medicine is consulted for medical co-management.     Left Knee Osteoarthritis s/p Left Total Knee Arthroplasty - Patient is now POD #0 for above procedure. No intraoperative complications. ESBL 100cc.   -Orthopedics primary service  -Pain management: acetaminophen, dilaudid IV, and oxycodone  -PT/OT     HTN - Resume PTA amlodipine 5mg BID tomorrow AM    KIMBERLEY - Continue PTA fluoxetine 15mg nightly and Ambien 10mg nightly     Peripheral Neuropathy - Continue PTA Pamelor 40mg nightly, Lyrica 50mg nightly PRN, and gabapentin cream.     Bronchiectasis - Patient notes diagnosis of bronchiectasis within past year in setting of long COVID.  Attributed to scar tissue from remote pneumonias.   -Continue PTA albuterol   -monitor oxygen saturations   -add incentive spirometry             Clinically Significant Risk Factors Present on Admission                       # Overweight: Estimated body mass index is 29.07 kg/m  as calculated from the following:    Height as of this encounter: 1.702 m (5' 7.01\").    Weight as of this encounter: 84.2 kg (185 lb 10 oz).           Carol Huynh PA-C  Hospitalist Service, GOLD TEAM 20  Securely message with O[employer] (more info)  Text page via [facility] Paging/Directory   See signed in provider for up to date coverage information  ______________________________________________________________________    Chief Complaint   Knee Pain     History is obtained from the patient and patient's chart     History of Present Illness   Delmis Parrish is a 72 year old female with a PMH as listed " above who presents for a scheduled left TKA.      Patient seen with  at bedside while eating lunch.  Patient describes some left knee pain but states it is tolerable. She confirms her past history of HTN, KIMBERLEY, neuropathy.  She elaborates on her history of bronchiectasis.  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  She then had COVID last year with long COVID sequelae. She was evaluated by outpatient Pulmonology who diagnosed her with bronchiectasis.       Past Medical History    Past Medical History:   Diagnosis Date     Gastroesophageal reflux disease      Generalized anxiety disorder      Insomnia      LVH (left ventricular hypertrophy)      Major depressive disorder, recurrent episode, mild (H)      Renovascular hypertension        Past Surgical History   Past Surgical History:   Procedure Laterality Date     BIOPSY BONE LOWER EXTREMITY Left 08/08/2022    Procedure: Biopsy Left Tibia;  Surgeon: Tee Espinoza MD;  Location: [facility] OR     CHOLECYSTECTOMY       CURETTAGE AND CEMENTATION LOWER EXTREMITY, COMBINED Left 08/08/2022    Procedure: With Curettage and Allograft Placement;  Surgeon: Tee Espinoza MD;  Location: [facility] OR     EXC TUMR SOFT TISSUE BACK/FLANK   2006     FLUOROGUIDE FOR SPINE INJECT   2008     HYSTERECTOMY         Medications   Facility-Administered Medications Prior to Admission   Medication Dose Route Frequency Provider Last Rate Last Admin     lidocaine (PF) (XYLOCAINE) 1 % injection 9 mL  9 mL   Tee Espinoza MD   9 mL at 10/12/22 1442     triamcinolone (KENALOG-40) injection 40 mg  40 mg   Tee Espinoza MD   40 mg at 10/12/22 1442     Medications Prior to Admission   Medication Sig Dispense Refill Last Dose     acetaminophen (TYLENOL) 325 MG tablet Take 2 tablets (650 mg) by mouth every 4 hours as needed for mild pain 50 tablet 0 3/26/2023     albuterol (PROAIR HFA/PROVENTIL HFA/VENTOLIN HFA) 108 (90 Base)  MCG/ACT inhaler INHALE 2 PUFFS BY MOUTH EVERY 6 HOURS AS NEEDED FOR WHEEZE   Past Week     amLODIPine (NORVASC) 5 MG tablet Take 1 tablet by mouth 2 times daily   3/27/2023 at 0400     FLUoxetine (PROZAC) 10 MG tablet Take 15 mg by mouth At Bedtime   3/26/2023 at 1800     GABAPENTIN 8%/VANICREAM (FV COMPOUNDED) CREAM Apply 1-2 g topically 2 times daily Apply topically to feet        LANsoprazole (PREVACID) 30 MG DR capsule TAKE 1 CAPSULE BY MOUTH EVERY DAY   3/26/2023 at 2200     LORazepam (ATIVAN) 0.5 MG tablet TAKE 1 TABLET BY MOUTH EVERY DAY AS NEEDED FOR ANXIETY   3/25/2023     nortriptyline (PAMELOR) 10 MG capsule TAKE 4 CAPSULES BY MOUTH DAILY AT BEDTIME.   3/26/2023 at 1800     pregabalin (LYRICA) 50 MG capsule Take 1-4 capsules ( mg) by mouth at bedtime as needed for pain. Do not stop suddenly   3/26/2023 at 2200     zolpidem (AMBIEN) 10 MG tablet TAKE 1 TABLET BY MOUTH EVERYDAY AT BEDTIME   3/26/2023 at 2200     aspirin 81 MG EC tablet Take 81 mg by mouth daily        celecoxib (CELEBREX) 50 MG capsule Take  mg by mouth daily as needed        cholecalciferol (VITAMIN D3) 25 mcg (1000 units) capsule Take 1 capsule by mouth daily        dicyclomine (BENTYL) 10 MG capsule Take 10 mg by mouth        estradiol (VAGIFEM) 10 MCG TABS vaginal tablet Place 10 mcg vaginally twice a week        fish oil-omega-3 fatty acids 1000 MG capsule Take 2 capsules by mouth        glucosamine-chondroitin 500-400 MG CAPS per capsule daily        ondansetron (ZOFRAN ODT) 4 MG ODT tab Take 4 mg by mouth        oxyCODONE (ROXICODONE) 5 MG tablet Take 1-2 tablets (5-10 mg) by mouth every 4 hours as needed for moderate to severe pain (Patient not taking: Reported on 3/24/2023) 20 tablet 0 Not Taking     senna-docusate (SENOKOT-S/PERICOLACE) 8.6-50 MG tablet Take 1-2 tablets by mouth 2 times daily (Patient not taking: Reported on 10/12/2022) 30 tablet 0      Vitamin D (Cholecalciferol) 25 MCG (1000 UT) CAPS               Physical Exam   Vital Signs: Temp: 97  F (36.1  C) Temp src: Axillary BP: 114/76 Pulse: 68   Resp: 10 SpO2: 95 % O2 Device: Nasal cannula Oxygen Delivery: 3 LPM  Weight: 185 lbs 10.04 oz    GENERAL: Alert and oriented x 3. NAD. Ambulatory. Cooperative.   HEENT: Anicteric sclera. Mucous membranes moist. NC. AT.   CV: RRR. S1, S2. No murmurs appreciated.   RESPIRATORY: Effort normal on RA Lungs CTAB with no wheezing, rales, rhonchi.   GI: Abdomen soft and non distended with normoactive bowel sounds present in all quadrants. No tenderness  NEUROLOGICAL: No focal deficits. Moves all extremities.    EXTREMITIES: No peripheral edema. Surgical dressing in place  SKIN: No jaundice. No rashes.        Medical Decision Making       50 MINUTES SPENT BY ME on the date of service doing chart review, history, exam, documentation & further activities per the note.      Data   Recent Labs   Lab 03/27/23  1302 03/27/23  0615 03/27/23  0550   WBC 10.6  --   --    HGB 13.1 14.4  --    MCV 89  --   --      --   --      --   --    POTASSIUM 4.0  --   --    CHLORIDE 104  --   --    CO2 23  --   --    BUN 20.0  --   --    CR 0.91  --   --    ANIONGAP 11  --   --    VETO 9.2  --   --    *  --  90

## 2023-03-27 NOTE — PROGRESS NOTES
[facility]  OUTPATIENT PHYSICAL THERAPY EVALUATION  PLAN OF TREATMENT FOR OUTPATIENT REHABILITATION  (COMPLETE FOR INITIAL CLAIMS ONLY)  Patient's Last Name, First Name, M.I.  YOB: 1950  Delmis Parrish                        Provider's Name  [facility] Medical Record No.  [id number]                             Onset Date:  03/27/23   Start of Care Date:      Type:     _X_PT   ___OT   ___SLP Medical Diagnosis:                 PT Diagnosis:  impaired functional mobility Visits from SOC:  1     See note for plan of treatment, functional goals and certification details    I CERTIFY THE NEED FOR THESE SERVICES FURNISHED UNDER        THIS PLAN OF TREATMENT AND WHILE UNDER MY CARE     (Physician co-signature of this document indicates review and certification of the therapy plan).                    03/27/23 1433   Appointment Info   Signing Clinician's Name / Credentials (PT) Su Bland DPT       Present no   Language English   Living Environment   People in Home spouse   Current Living Arrangements house   Home Accessibility stairs to enter home   Number of Stairs, Main Entrance 3   Stair Railings, Main Entrance railings safe and in good condition;railing on right side (ascending)   Number of Stairs, Within Home, Primary none   Stair Railings, Within Home, Primary none   Transportation Anticipated family or friend will provide   Self-Care   Usual Activity Tolerance good   Current Activity Tolerance moderate   Regular Exercise No   Equipment Currently Used at Home none   Fall history within last six months no   General Information   Onset of Illness/Injury or Date of Surgery 03/27/23   Referring Physician Carol Huynh, SHEREEN   Patient/Family Therapy Goals Statement (PT) Be able to enjoy the summer, decrease pain   Pertinent History of Current Problem (include personal factors and/or comorbidities that impact the POC)  s/p L TKA   Existing Precautions/Restrictions fall   Weight-Bearing Status - LUE full weight-bearing   Weight-Bearing Status - RUE full weight-bearing   Weight-Bearing Status - LLE weight-bearing as tolerated   Weight-Bearing Status - RLE full weight-bearing   General Observations Supine in bed upon arrival, pleasant and agreeable   Cognition   Affect/Mental Status (Cognition) WNL   Orientation Status (Cognition) oriented x 3   Follows Commands (Cognition) WNL   Pain Assessment   Patient Currently in Pain Yes, see Vital Sign flowsheet   Integumentary/Edema   Integumentary/Edema Comments Patient with normal post-surgical swelling present to L LE   Posture    Posture Forward head position;Protracted shoulders;Kyphosis   Range of Motion (ROM)   ROM Comment Did not formally assess, demonstrates functional ROM with mobility. L knee flexion to approximately 60 degrees with heelslide   Strength (Manual Muscle Testing)   Strength Comments Did not formally assess, demonstrates ability to complete SLR without assist   Bed Mobility   Comment, (Bed Mobility) SBA for supine<>sit transfer   Transfers   Comment, (Transfers) CGA for sit<>stand transfer   Gait/Stairs (Locomotion)   Distance in Feet (Gait) 100'   Comment, (Gait/Stairs) CGA progressing to SBA with ambulation using walker   Balance   Balance Comments Independent sitting balance, SBA for standing balance with UE support from walker   Sensory Examination   Sensory Perception WNL   Clinical Impression   Criteria for Skilled Therapeutic Intervention Yes, treatment indicated   PT Diagnosis (PT) impaired functional mobility   Influenced by the following impairments increased post-op pain, decreased L LE strength and ROM   Functional limitations due to impairments impaired bed mobility, transfers and ambulation   Clinical Presentation (PT Evaluation Complexity) Stable/Uncomplicated   Clinical Presentation Rationale Per clinical judgment   Clinical Decision Making (Complexity)  low complexity   Planned Therapy Interventions (PT) balance training;bed mobility training;gait training;home exercise program;stair training;strengthening;transfer training;progressive activity/exercise;risk factor education;home program guidelines   Anticipated Equipment Needs at Discharge (PT) walker, rolling   Risk & Benefits of therapy have been explained evaluation/treatment results reviewed;care plan/treatment goals reviewed;risks/benefits reviewed;current/potential barriers reviewed   PT Total Evaluation Time   PT Eval, Low Complexity Minutes (33901) 6   Physical Therapy Goals   PT Frequency 2x/day   PT Predicted Duration/Target Date for Goal Attainment 03/29/23   PT Goals Bed Mobility;Transfers;Gait;Stairs   PT: Bed Mobility Independent;Supine to/from sit   PT: Transfers Supervision/stand-by assist;Sit to/from stand;Bed to/from chair;Assistive device   PT: Gait Supervision/stand-by assist;Assistive device;150 feet   PT: Stairs Supervision/stand-by assist;3 stairs;Assistive device;Rail on right   Therapeutic Procedure/Exercise   Ther. Procedure: strength, endurance, ROM, flexibillity Minutes (56796) 12   Symptoms Noted During/After Treatment fatigue;increased pain   Treatment Detail/Skilled Intervention PT: Supine in bed upon arrival, agreeable to PT. Issued HEP and completed x 10 for strength and ROM including AP, QS, GS, HS, heelslides (assist), SAQ, SLR. Good tolerance noted.   Therapeutic Activity   Therapeutic Activities: dynamic activities to improve functional performance Minutes (34359) 4   Symptoms Noted During/After Treatment Fatigue;Increased pain   Treatment Detail/Skilled Intervention PT: Supine in bed upon arrival, agreeable to PT. Completes supine<>sit transfer with SBA. Sat EOB with good tolerance. Completes sit<>stand transfer with SBA and use of walker. Ended in bed with needs in reach.   Gait Training   Gait Training Minutes (38630) 9   Symptoms Noted During/After Treatment (Gait Training)  "fatigue;increased pain   Treatment Detail/Skilled Intervention PT: Lowered walker for improved fit. Verbalized feeling \"much better\". Slow but steady. Able to progress from step to to step through gait pattern.   [place] Level (Gait Training) stand-by assist   Physical Assistance Level (Gait Training) set-up required;supervision;verbal cues   Weight Bearing (Gait Training) weight-bearing as tolerated   Assistive Device (Gait Training) rolling walker   Pattern Analysis (Gait Training) swing-through gait   Gait Analysis Deviations decreased luz elena;increased time in double stance;decreased velocity of limb motion;decreased step length;decreased weight-shifting ability   Impairments (Gait Analysis/Training) pain;ROM decreased;strength decreased   PT Discharge Planning   PT Plan PT: Review HEP, gait, 3 stairs with single rail   PT Discharge Recommendation (DC Rec)   (Defer to ortho)   PT Rationale for DC Rec PT: For progression per TKA protocol   PT Brief overview of current status PT: SBA for all mobility with use of walker   Total Session Time   Timed Code Treatment Minutes 25   Total Session Time (sum of timed and untimed services) 31     "

## 2023-03-27 NOTE — BRIEF OP NOTE
[facility]    Brief Operative Note    Pre-operative diagnosis: Primary osteoarthritis of left knee [M17.12]  Post-operative diagnosis Same as pre-operative diagnosis    Procedure: Procedure(s):  ARTHROPLASTY, KNEE, TOTAL LEFT  Surgeon: Surgeon(s) and Role:     * Tee Espinoza MD - Primary     * Kalani Betts MD - Resident - Assisting  Anesthesia: General with Adductor Block   Estimated Blood Loss: 200 ml    Drains: Hemovac  Specimens: * No specimens in log *  Findings:   Please see full operative note .  Complications: None.  Implants:   Implant Name Type Inv. Item Serial No.  Lot No. LRB No. Used Action   BONE CEMENT RADIOPAQUE SIMPLEX HV FULL DOSE 6194-1-001 - [id number] Cement, Bone BONE CEMENT RADIOPAQUE SIMPLEX HV FULL DOSE 6194-1-001  [employer] [id number] Left 2 Implanted   IMP COMP FEM STRK TRIATHLN PS LT 4 5515-F-401 - [id number] Total Joint Component/Insert IMP COMP FEM STRK TRIATHLN PS LT 4 5515-F-401  [employer] [id number] Left 1 Implanted   IMP INSERT BASEPLATE TIBIAL HOWM TRI 4 5521-B-400 - [id number] Total Joint Component/Insert IMP INSERT BASEPLATE TIBIAL HOWM TRI 4 5521-B-400  [employer] [id number] Left 1 Implanted   IMP COMP PATELLA TRI 33X9MM 5550-L-339 - [id number] Total Joint Component/Insert IMP COMP PATELLA TRI 33X9MM 5550-L-339  [employer] [id number] Left 1 Implanted   IMP INSERT TIBIAL STRK TRI SIZE 4 09MM 8532-V-708-E - [id number] Total Joint Component/Insert IMP INSERT TIBIAL STRK TRI SIZE 4 09MM [id number]  [employer] [id number] Left 1 Implanted       Post-Op Plan:  Assessment/Plan: 72 year old female s/p L TKA on 3/27/2023 with Dr. Espinoza    Activity: Up with assist and assistive devices as needed until independent. Knee ROM as tolerated.   Weight bearing status: WBAT    Antibiotics: Ancef perioperative   Diet: Begin with clear fluids and progress diet as tolerated. Bowel  regimen. Anti-emetics PRN.    DVT prophylaxis:  ASA 81 mg BID x 4 weeks   Elevation: Elevate heels off of bed on pillows, no pillows behind the knee at any time    Wound Care: Aquacel dressing, remove POD7   Pain management: Orals PRN, IV for breakthrough only  X-rays: AP/Lat operative knee XR in PACU.  Physical Therapy: Mobilization, ROM, ADL's  Occupational Therapy: ADL's  Labs: Trend Hgb on PODs #1  Cultures: None  Consults: PT, OT. Hospitalist, appreciate assistance in caring for this patient throughout the perioperative period   Follow up 2 weeks wound check    Disposition: Pending progress with therapies, pain control on orals, and medical stability, anticipate discharge to Home on POD #1-2.    Norma Betts PGY4  Orthopedic Surgery  [phone]

## 2023-03-27 NOTE — OP NOTE
DATE OF SURGERY: 3/27/2023    PREOPERATIVE DIAGNOSIS: Left knee osteoarthritis    POSTOPERATIVE DIAGNOSIS: Left knee osteoarthritis    PROCEDURE: Left total knee arthroplasty    SURGEON: Tee Espinoza MD     ASSISTANT: Kalani Betts MD    PATIENT HISTORY: This patient has medial compartment osteoarthritis.  She had a large cyst in the knee which was grafted but this did not relieve her pain.  She presents now understand the risks of infection bleeding pain numbness tingling deep venous thrombosis pulmonary embolism stiffness and limp.    DESCRIPTION OF PROCEDURE: The patient underwent successful induction of anesthesia.  The left leg was washed and sterilely prepped and draped.  We made an anterior incision and did a median parapatellar approach.  I used an intramedullary guide to trim 8 mm off the distal femur and then used an extra medullary guide to trim the proximal tibial plateau using a 2 mm cut medially as a reference.  I used the tensioner and found that extension we did not have quite enough space.  I then took another 2 mm off the distal femur.  I sized the femur out of 4 and then made the anterior posterior and chamfer cuts.  I removed some posterior osteophytes and the residual meniscus and then cut our box for a PS knee.  We sized the tibia as a 4 as well.  I used a drop ree to help with orientation and then pin the trial tibial component in place and instrumented the tibia for universal baseplate.  We had to use a saw to remove some of the calcium phosphate cement in the tibial plateau.  A size 9 insert allowed full extension with little varus or valgus laxity.  We measured the patella at 22 and then set our jig to cut off 9 mm of the patella.  We instrumented the patella with a 33 round 9 mm component.  We then removed the trial components and used pulse lavage to clean out the bone cuts.  We cemented in a size 4 universal tibial baseplate and a size 4 PS femur and also the 9 x 33  patellar component.  We used a trial insert and reduced the knee.  Excess cement was removed.  When the cement was set we felt that the 9 was an ideal size and so we remove the trial cleaned the components and implanted a 9 PS polyethylene insert without difficulty.  We then irrigated more and placed a 1/8 inch Hemovac drain and closed.  #1 Vicryl was used in the quadricep tendon and proximal capsule.  0 Vicryl was used in the distal capsule.  2-0 Vicryl was used in the bursa and peritenon layer followed by Vicryl in the subcutaneous tissue Monocryl in the skin Steri-Strips and a sterile dry dressing.  The patient was then extubated and taken to the recovery room in stable condition.  There were no complications.  I was present for all critical portions of the procedure.  The estimated blood loss is 100 mL.  We used [place] components.    Tee Espinoza MD

## 2023-03-27 NOTE — ANESTHESIA POSTPROCEDURE EVALUATION
Patient: Delmis Parrish    Procedure: Procedure(s):  ARTHROPLASTY, KNEE, TOTAL LEFT       Anesthesia Type:  General    Note:  Disposition: Outpatient   Postop Pain Control: Uneventful            Sign Out: Well controlled pain   PONV: No   Neuro/Psych: Uneventful            Sign Out: Acceptable/Baseline neuro status   Airway/Respiratory: Uneventful            Sign Out: Acceptable/Baseline resp. status   CV/Hemodynamics: Uneventful            Sign Out: Acceptable CV status; No obvious hypovolemia; No obvious fluid overload   Other NRE: NONE   DID A NON-ROUTINE EVENT OCCUR? No           Last vitals:  Vitals Value Taken Time   /76 03/27/23 1145   Temp 36.1  C (97  F) 03/27/23 1145   Pulse 71 03/27/23 1152   Resp 10 03/27/23 1145   SpO2 95 % 03/27/23 1152   Vitals shown include unvalidated device data.    Electronically Signed By: Mel Diaz MD  March 27, 2023  2:28 PM

## 2023-03-28 ENCOUNTER — APPOINTMENT (OUTPATIENT)
Dept: PHYSICAL THERAPY | Facility: CLINIC | Age: 73
End: 2023-03-28
Attending: ORTHOPAEDIC SURGERY
Payer: COMMERCIAL

## 2023-03-28 VITALS
RESPIRATION RATE: 16 BRPM | BODY MASS INDEX: 29.13 KG/M2 | OXYGEN SATURATION: 93 % | DIASTOLIC BLOOD PRESSURE: 61 MMHG | WEIGHT: 185.63 LBS | TEMPERATURE: 96.9 F | SYSTOLIC BLOOD PRESSURE: 113 MMHG | HEART RATE: 75 BPM | HEIGHT: 67 IN

## 2023-03-28 LAB
ANION GAP SERPL CALCULATED.3IONS-SCNC: 10 MMOL/L (ref 7–15)
BUN SERPL-MCNC: 19.5 MG/DL (ref 8–23)
CALCIUM SERPL-MCNC: 9.2 MG/DL (ref 8.8–10.2)
CHLORIDE SERPL-SCNC: 103 MMOL/L (ref 98–107)
CREAT SERPL-MCNC: 0.89 MG/DL (ref 0.51–0.95)
DEPRECATED HCO3 PLAS-SCNC: 21 MMOL/L (ref 22–29)
ERYTHROCYTE [DISTWIDTH] IN BLOOD BY AUTOMATED COUNT: 13.6 % (ref 10–15)
GFR SERPL CREATININE-BSD FRML MDRD: 69 ML/MIN/1.73M2
GLUCOSE BLDC GLUCOMTR-MCNC: 133 MG/DL (ref 70–99)
GLUCOSE SERPL-MCNC: 144 MG/DL (ref 70–99)
HCT VFR BLD AUTO: 33.8 % (ref 35–47)
HGB BLD-MCNC: 10.9 G/DL (ref 11.7–15.7)
MCH RBC QN AUTO: 28.6 PG (ref 26.5–33)
MCHC RBC AUTO-ENTMCNC: 32.2 G/DL (ref 31.5–36.5)
MCV RBC AUTO: 89 FL (ref 78–100)
PLATELET # BLD AUTO: 215 10E3/UL (ref 150–450)
POTASSIUM SERPL-SCNC: 4.3 MMOL/L (ref 3.4–5.3)
RBC # BLD AUTO: 3.81 10E6/UL (ref 3.8–5.2)
SODIUM SERPL-SCNC: 134 MMOL/L (ref 136–145)
WBC # BLD AUTO: 12.1 10E3/UL (ref 4–11)

## 2023-03-28 PROCEDURE — 250N000013 HC RX MED GY IP 250 OP 250 PS 637: Performed by: STUDENT IN AN ORGANIZED HEALTH CARE EDUCATION/TRAINING PROGRAM

## 2023-03-28 PROCEDURE — 85027 COMPLETE CBC AUTOMATED: CPT | Performed by: PHYSICIAN ASSISTANT

## 2023-03-28 PROCEDURE — 82310 ASSAY OF CALCIUM: CPT | Performed by: PHYSICIAN ASSISTANT

## 2023-03-28 PROCEDURE — 250N000011 HC RX IP 250 OP 636: Performed by: PHYSICIAN ASSISTANT

## 2023-03-28 PROCEDURE — 99233 SBSQ HOSP IP/OBS HIGH 50: CPT | Performed by: PHYSICIAN ASSISTANT

## 2023-03-28 PROCEDURE — 97110 THERAPEUTIC EXERCISES: CPT | Mod: GP

## 2023-03-28 PROCEDURE — 250N000013 HC RX MED GY IP 250 OP 250 PS 637: Performed by: PHYSICIAN ASSISTANT

## 2023-03-28 PROCEDURE — 999N000111 HC STATISTIC OT IP EVAL DEFER

## 2023-03-28 PROCEDURE — 97116 GAIT TRAINING THERAPY: CPT | Mod: GP

## 2023-03-28 PROCEDURE — 82962 GLUCOSE BLOOD TEST: CPT

## 2023-03-28 PROCEDURE — 36415 COLL VENOUS BLD VENIPUNCTURE: CPT | Performed by: PHYSICIAN ASSISTANT

## 2023-03-28 RX ORDER — POLYETHYLENE GLYCOL 3350 17 G/17G
17 POWDER, FOR SOLUTION ORAL DAILY
Qty: 10 PACKET | Refills: 0 | Status: SHIPPED | OUTPATIENT
Start: 2023-03-28

## 2023-03-28 RX ORDER — OXYCODONE HYDROCHLORIDE 5 MG/1
5 TABLET ORAL EVERY 4 HOURS PRN
Qty: 26 TABLET | Refills: 0 | Status: ON HOLD | OUTPATIENT
Start: 2023-03-28 | End: 2024-05-13

## 2023-03-28 RX ORDER — METHOCARBAMOL 500 MG/1
500 TABLET, FILM COATED ORAL 4 TIMES DAILY
Qty: 40 TABLET | Refills: 0 | Status: SHIPPED | OUTPATIENT
Start: 2023-03-28

## 2023-03-28 RX ORDER — METHOCARBAMOL 500 MG/1
500 TABLET, FILM COATED ORAL 4 TIMES DAILY PRN
Status: DISCONTINUED | OUTPATIENT
Start: 2023-03-28 | End: 2023-03-28 | Stop reason: HOSPADM

## 2023-03-28 RX ORDER — ACETAMINOPHEN 325 MG/1
650 TABLET ORAL EVERY 4 HOURS PRN
Qty: 60 TABLET | Refills: 0 | Status: SHIPPED | OUTPATIENT
Start: 2023-03-30

## 2023-03-28 RX ORDER — AMOXICILLIN 250 MG
1 CAPSULE ORAL 2 TIMES DAILY
Qty: 30 TABLET | Refills: 0 | Status: ON HOLD | OUTPATIENT
Start: 2023-03-28 | End: 2024-05-13

## 2023-03-28 RX ORDER — TIZANIDINE 2 MG/1
2 TABLET ORAL EVERY 6 HOURS PRN
Qty: 30 TABLET | Refills: 0 | Status: SHIPPED | OUTPATIENT
Start: 2023-03-28 | End: 2023-03-28

## 2023-03-28 RX ADMIN — TIZANIDINE 2 MG: 2 TABLET ORAL at 08:16

## 2023-03-28 RX ADMIN — OXYCODONE HYDROCHLORIDE 5 MG: 5 TABLET ORAL at 13:11

## 2023-03-28 RX ADMIN — OXYCODONE HYDROCHLORIDE 5 MG: 5 TABLET ORAL at 09:17

## 2023-03-28 RX ADMIN — FLUOXETINE HYDROCHLORIDE 15 MG: 10 TABLET ORAL at 08:11

## 2023-03-28 RX ADMIN — AMLODIPINE BESYLATE 5 MG: 5 TABLET ORAL at 08:10

## 2023-03-28 RX ADMIN — ACETAMINOPHEN 975 MG: 325 TABLET ORAL at 02:14

## 2023-03-28 RX ADMIN — ACETAMINOPHEN 975 MG: 325 TABLET ORAL at 13:10

## 2023-03-28 RX ADMIN — ASPIRIN 81 MG: 81 TABLET, COATED ORAL at 08:11

## 2023-03-28 RX ADMIN — SENNOSIDES AND DOCUSATE SODIUM 1 TABLET: 50; 8.6 TABLET ORAL at 08:11

## 2023-03-28 RX ADMIN — POLYETHYLENE GLYCOL 3350 17 G: 17 POWDER, FOR SOLUTION ORAL at 08:10

## 2023-03-28 RX ADMIN — TIZANIDINE 2 MG: 2 TABLET ORAL at 00:31

## 2023-03-28 RX ADMIN — CEFAZOLIN SODIUM 2 G: 2 INJECTION, SOLUTION INTRAVENOUS at 00:37

## 2023-03-28 ASSESSMENT — ACTIVITIES OF DAILY LIVING (ADL)
ADLS_ACUITY_SCORE: 25

## 2023-03-28 NOTE — PROGRESS NOTES
Orthopaedic Surgery Progress Note 03/28/2023    S: No acute events overnight. Initial postoperative discomfort, improved significantly overnight with muscle relaxer. Pain this AM controlled on meds. Denies numbness or tingling in the affected extremity. She does have some baseline SOB, no new changes since surgery. chest pain (-), nausea/vomiting(-). Tolerating oral diet. BM(-) flatus(+). Voiding spontaneously.  Ambulating, worked with PT yesterday.     O:  Temp: (!) 96.1  F (35.6  C) Temp src: Oral BP: 114/74 Pulse: 74   Resp: 16 SpO2: 97 % O2 Device: Nasal cannula Oxygen Delivery: 1 LPM    Exam:  Gen: No acute distress, resting comfortably in bed.  Resp: Non-labored breathing  MSK:    LLE:  - Dressings c/d/i  - SILT femoral/tibial/sural/saphenous/DP/SP nerves  - Fires Quad, TA, EHL, FHL, GaSC  - DP pulses 2+, foot wwp    Drain output: 105/100/175ml last 3 shifts.    Recent Labs   Lab 03/27/23  1302 03/27/23  0615   WBC 10.6  --    HGB 13.1 14.4     --        Assessment: Delmis Parrish is a 72 year old female s/p L TKA on 3/28/2023 with Dr. Espinoza. Doing well.    Plan 3/28:  - PT/OT  - Remove drain   - Likely discharge to home     Plan:  Ortho Primary  Activity: Up with assist.  Weight bearing status: WBAT  Antibiotics: Ancef x24 hours perioperatively.  Diet: Begin with clear fluids and progress diet as tolerated\.  DVT prophylaxis: ASA.  Wound Care: Remove dressing POD7  Drains: Document output per shift  Pain management: transition from IV to orals as tolerated.   X-rays: complete  Physical Therapy:  ROM, ADL's.  Occupational Therapy: ADL's.  Labs: Trend Hgb on POD #1.  Consults: PT, OT. Medicine, appreciate assistance in caring for this patient.  Follow-up: Clinic in 2 weeks for wound check  Future Appointments   Date Time Provider Department Center   3/28/2023  8:15 AM Loan Nagel, PT [facility]   3/28/2023  9:00 AM Christina Pressley, OT [facility]   3/28/2023  1:00 PM Loan Nagel  PT [facility]   4/12/2023  9:45 AM Tee Espinoza MD [facility]       Disposition: Pending progress with therapies, pain control on orals, and medical stability, anticipate discharge to home on POD #1.    Patient discussed with .      Norma Betts MD  PGY-4  Orthopaedic Surgery

## 2023-03-28 NOTE — PLAN OF CARE
VS: Temp: (!) 96.1  F (35.6  C) Temp src: Oral BP: 114/74 Pulse: 74   Resp: 16 SpO2: 97 % O2 Device: Nasal cannula Oxygen Delivery: 1 LPM    O2: SpO2 > 95% and stable. LS clear and equal bilaterally. Denies chest pain and SOB. Pt refused CAPNO & on 1 LPM  of O2 & continues pulse oximeter.    Output: Voids spontaneously without difficulty to bathroom. PVR was 302 @ 0113.   Last BM: 3/26/2023 per pt, denies abdominal discomfort. BS active.    Activity: Up with SBA  using walker & GB.   Skin: WDL except, L knee surgical incision.    Pain: Pain On L knee was managed with scheduled Tylenol & PRN Zanaflex.    CMS: Intact, AOx4. Denies numbness and tingling.   Dressing: Dressing on L knee surgical incision is CDI. Hemovac in place with out pt of 175mL & 60 mL.  Hemovac was disconnected & the Ace wrap got some blood, so new Ace wrap was placed.   Diet: Regular diet. Denies nausea/vomiting.   BG POD1 : 133   LDA: R PIV SL.   Equipment: IV pole, personal belongings, walker & GB   Plan: TBD. Fall precautions maintained / Continue with plan of care. Call light within reach, pt able to make needs known.    Additional Info:

## 2023-03-28 NOTE — PLAN OF CARE
Occupational Therapy: Orders received. Chart reviewed and discussed with care team.? Occupational Therapy not indicated due to pt near baseline with ADL, has excellent support in place from family and friends.? Defer discharge recommendations to medical team.? Will complete orders.

## 2023-03-28 NOTE — PROGRESS NOTES
"[facility]    Medicine Progress Note - Hospitalist Service, GOLD TEAM 20    Date of Admission:  3/27/2023    Assessment & Plan   Delmis Parrish is a 72 year old female with a past medical history of bronchiectasis, GERD, KIMBERLEY, and HTN who presents for her scheduled left TKA with Dr. Espinoza.  Internal Medicine is consulted for medical co-management.      Left Knee Osteoarthritis s/p Left Total Knee Arthroplasty - Patient is now POD #1 for above procedure. No intraoperative complications. ESBL 100cc.   -Orthopedics primary service  -Pain management: acetaminophen, dilaudid IV, and oxycodone  -PT/OT   -tentative discharge home today      Hyponatremia - Likely from IV fluids during surgery and post op.    -recommend repeat labs within one week of discharge    Leukocytosis - Wbc 12.1, likely an acute stress reactant from surgery   -CBC in one week of discharge     Acute Blood Loss Anemia - Hgb 10.9, likely blood loss from surgery.   -CBC in one week.     HTN - Resume PTA amlodipine 5mg BID      KIMBERLEY - Continue PTA fluoxetine 15mg nightly and Ambien 10mg nightly      Peripheral Neuropathy - Continue PTA Pamelor 40mg nightly, Lyrica 50mg nightly PRN, and gabapentin cream.      Bronchiectasis - Patient notes diagnosis of bronchiectasis within past year in setting of long COVID.  Attributed to scar tissue from remote pneumonias.   -Continue PTA albuterol   -monitor oxygen saturations             Diet: Advance Diet as Tolerated: Regular Diet Adult  Discharge Instruction - Regular Diet Adult    DVT Prophylaxis: Ambulate every shift  Kang Catheter: Not present  Lines: None     Cardiac Monitoring: None  Code Status: Full Code      Clinically Significant Risk Factors Present on Admission                       # Overweight: Estimated body mass index is 29.07 kg/m  as calculated from the following:    Height as of this encounter: 1.702 m (5' 7.01\").    Weight as of this encounter: 84.2 " kg (185 lb 10 oz).           Disposition Plan     Expected Discharge Date: 03/28/2023                    Carol Huynh PA-C  Hospitalist Service, GOLD TEAM 20  [facility]  Securely message with [employer] (more info)  Text page via [facility] Paging/Directory   See signed in provider for up to date coverage information  ______________________________________________________________________    Interval History   Patient well appearing, states she's much improved from yesterday. She was able to walk down the bone. Plans to order breakfast.  States pain is well managed.     Physical Exam   Vital Signs: Temp: (!) 96.1  F (35.6  C) Temp src: Oral BP: 114/74 Pulse: 74   Resp: 16 SpO2: 97 % O2 Device: Nasal cannula Oxygen Delivery: 1 LPM  Weight: 185 lbs 10.04 oz    GENERAL: Alert and oriented x 3. NAD. Ambulatory. Cooperative.   HEENT: Anicteric sclera. Mucous membranes moist. NC. AT.   CV: RRR. S1, S2. No murmurs appreciated.   RESPIRATORY: Effort normal on RA. Lungs CTAB with no wheezing, rales, rhonchi.   GI: Abdomen soft and non distended with normoactive bowel sounds present in all quadrants. No tenderness  NEUROLOGICAL: No focal deficits. Moves all extremities.    EXTREMITIES: No peripheral edema.  SKIN: No jaundice. No rashes.        Medical Decision Making       50 MINUTES SPENT BY ME on the date of service doing chart review, history, exam, documentation & further activities per the note.      Data   Recent Labs   Lab 03/28/23  0634 03/28/23  0552 03/27/23  1302 03/27/23  0615   WBC  --  12.1* 10.6  --    HGB  --  10.9* 13.1 14.4   MCV  --  89 89  --    PLT  --  215 228  --    NA  --  134* 138  --    POTASSIUM  --  4.3 4.0  --    CHLORIDE  --  103 104  --    CO2  --  21* 23  --    BUN  --  19.5 20.0  --    CR  --  0.89 0.91  --    ANIONGAP  --  10 11  --    VETO  --  9.2 9.2  --    * 144* 147*  --

## 2023-03-28 NOTE — PROGRESS NOTES
SPIRITUAL HEALTH SERVICES  SPIRITUAL ASSESSMENT Progress Note  [facility] ([facility]) M 527 ortho 03/28/23    REFERRAL SOURCE:     Pt declined visit with Unit . Family was present and Pt seemed to be doing well and in good spirits.    PLAN: No follow up necessary.    Low Timmons MA, MPA  Associate   Pager: [phone]

## 2023-03-28 NOTE — PLAN OF CARE
Physical Therapy Discharge Summary    Reason for therapy discharge:    Discharged to home with outpatient therapy.  All goals and outcomes met, no further needs identified.    Progress towards therapy goal(s). See goals on Care Plan in [facility] electronic health record for goal details.  Goals met    Therapy recommendation(s):    Continued therapy is recommended.  Rationale/Recommendations:  Outpatient PT to continue with progression of TKA protocol.

## 2023-03-28 NOTE — PLAN OF CARE
DISCHARGE SUMMARY    Pt discharging to: home  Transportation:  will pick her up  AVS given and discussed: yes  Stoplight Tool given and discussed: yes  Medications given: yes  Belongings returned: remain with patient  Comments:

## 2023-03-28 NOTE — PLAN OF CARE
[id number]    VS: Temp: 96.9  F (36.1  C) Temp src: Oral BP: (!) 141/83 Pulse: 75   Resp: 19 SpO2: 97 % O2 Device: Nasal cannula Oxygen Delivery: 1 LPM     O2: 97% on 1LPM via nasal cannula, denies SOB and CP, no cough present. Lung sounds clear.   Output: Voiding spontaneously and without difficulty   Last BM: 03/26/2023 per pt report   Activity: SBA w/ gait belt & walker, WBAT LLE   Skin: L) knee surgical incision, scab to R) shin   Pain: Managed with scheduled tylenol, PRN oxycodone and PRN zanaflex   Neuro: A & O x4, denies numbness & tingling   Dressing: CDI, ACE wrap in place   Diet: Regular   LDA: R) PIV infusing LR at 100ml/hr, L) knee hemovac   Equipment: Personal belongings, call light, walker, gait belt, IV pump/pole   Plan: Possible discharge to home tomorrow   Additional Info:

## 2023-03-28 NOTE — PLAN OF CARE
VS:       Pt A/O X 4. Afebrile. VSS. Lungs- CTA bilaterally with both       anterior and posterior. IS encouraged. Denies nausea, shortness of breath, and chest pain.     Output:       Bowel sound active in all four quadrants. Voids spontaneously without difficulty in the bathroom.     Activity:       Pt up A1 with GB/Walker, WBAT on RLE.     Skin: Intact, dressing c/d/I.     Pain:       Has pain in the R knee and given with PRN Oxycodone and scheduled Tylenol,ICE applied, and is tolerating.     CMS:       CMS and Neuro's are intact. Denies numbness and tingling in all extremities.      Dressing:       Clean, dry, intact     Diet:       Pt is on a regular diet and appetite was good this shift.       LDA:       PIV and drain removed.   Equipment:       Utilizes walker and gait belt on ambulation   Plan:       Discharging home today.   Additional Info:

## 2023-03-29 NOTE — DISCHARGE SUMMARY
ORTHOPAEDIC SURGERY DISCHARGE SUMMARY     Date of Admission: 3/27/2023  Date of Discharge: 3/28/2023  1:45 PM  Disposition: Home  Staff Physician: Dr. Espinoza  Primary Care Provider: Monie Dockery    DISCHARGE DIAGNOSIS:  Primary osteoarthritis of left knee [M17.12]    PROCEDURES: Procedure(s):  ARTHROPLASTY, KNEE, TOTAL LEFT on 3/27/2023    BRIEF HISTORY:  Delmis Parrish is a 72 year old female with history of curettage and synthetic grafting of a left tibial defect with subsequent progression of left knee osteoarthritis who failed nonoperative management of her knee pain. Risks and benefits of nonoperative versus operative intervention was had with the patient, and decision was to undergo operative intervention.     HOSPITAL COURSE:    The patient was admitted following the above listed procedures for pain control and rehabilitation. Delmis Parrish did well post-operatively. Medicine was consulted post operatively to aid in management of medical co-morbidities. The patient received routine nursing cares and at the time of discharge was medically stable. Vital signs were stable throughout admission. The patient is tolerating a regular diet and is voiding spontaneously. All PT/OT goals have been met for safe mobility. Pain is now controlled on oral medications which will be available on discharge. Stool softeners have been used while taking pain medications to help prevent constipation. Delmis Parrish is deemed medically safe to discharge.     Antibiotics:  Ancef given periop and 24 hours postop.   DVT prophylaxis:  ASA initiated after surgery and will be continued for 4 weeks.   PT Progress:  Has met PT/OT goals for safe mobility.    Pain Meds:  Weaned off all IV pain meds by discharge.  Inpatient Events: No significant events or complications.       FOLLOWUP:    Follow up 2 weeks wound check     Future Appointments   Date Time Provider Department Center   4/12/2023  9:45 AM Tee Espinoza MD [facility]        Orthopaedic Surgery appointments are at the [facility] ([address] [place] [id number]). Call [phone] to schedule a follow-up appointment at this location with your provider.     PLANNED DISCHARGE ORDERS:      Discharge Medication List as of 3/28/2023  1:15 PM      START taking these medications    Details   methocarbamol (ROBAXIN) 500 MG tablet Take 1 tablet (500 mg) by mouth 4 times daily, Disp-40 tablet, R-0, E-Prescribe      polyethylene glycol (MIRALAX) 17 g packet Take 17 g by mouth daily, Disp-10 packet, R-0, E-Prescribe         CONTINUE these medications which have CHANGED    Details   acetaminophen (TYLENOL) 325 MG tablet Take 2 tablets (650 mg) by mouth every 4 hours as needed for other, Disp-60 tablet, R-0, E-Prescribe      aspirin (ASA) 81 MG EC tablet Take 1 tablet (81 mg) by mouth 2 times daily, Disp-60 tablet, R-0, E-Prescribe      oxyCODONE (ROXICODONE) 5 MG tablet Take 1 tablet (5 mg) by mouth every 4 hours as needed for moderate pain (4-6), Disp-26 tablet, R-0, E-Prescribe      senna-docusate (SENOKOT-S/PERICOLACE) 8.6-50 MG tablet Take 1 tablet by mouth 2 times daily, Disp-30 tablet, R-0, E-Prescribe         CONTINUE these medications which have NOT CHANGED    Details   albuterol (PROAIR HFA/PROVENTIL HFA/VENTOLIN HFA) 108 (90 Base) MCG/ACT inhaler INHALE 2 PUFFS BY MOUTH EVERY 6 HOURS AS NEEDED FOR WHEEZE, Historical      amLODIPine (NORVASC) 5 MG tablet Take 1 tablet by mouth 2 times daily, Historical      celecoxib (CELEBREX) 50 MG capsule Take  mg by mouth daily as needed, Historical      cholecalciferol (VITAMIN D3) 25 mcg (1000 units) capsule Take 1 capsule by mouth daily, Historical      estradiol (VAGIFEM) 10 MCG TABS vaginal tablet Place 10 mcg vaginally twice a week, Historical      fish oil-omega-3 fatty acids 1000 MG capsule Take 1 capsule by mouth, Historical      FLUoxetine (PROZAC) 10 MG tablet Take 15 mg by mouth At Bedtime,  "Historical      GABAPENTIN 8%/VANICREAM (FV COMPOUNDED) CREAM Apply 1-2 g topically 2 times daily Apply topically to feet, Historical      glucosamine-chondroitin 500-400 MG CAPS per capsule daily, Historical      LANsoprazole (PREVACID) 30 MG DR capsule TAKE 1 CAPSULE BY MOUTH EVERY DAY, Historical      LORazepam (ATIVAN) 0.5 MG tablet TAKE 1 TABLET BY MOUTH EVERY DAY AS NEEDED FOR ANXIETY, Historical      nortriptyline (PAMELOR) 10 MG capsule TAKE 4 CAPSULES BY MOUTH DAILY AT BEDTIME., Historical      ondansetron (ZOFRAN ODT) 4 MG ODT tab Take 4 mg by mouth as needed, Historical      !! pregabalin (LYRICA) 100 MG capsule Take 200 mg by mouth daily at bedtime, Historical      !! pregabalin (LYRICA) 25 MG capsule Take 25 mg by mouth daily at bedtime, Historical      zolpidem (AMBIEN) 10 MG tablet TAKE 1 TABLET BY MOUTH EVERYDAY AT BEDTIME, Historical       !! - Potential duplicate medications found. Please discuss with provider.      STOP taking these medications       tiZANidine (ZANAFLEX) 2 MG tablet Comments:   Reason for Stopping:                 Discharge Procedure Orders   Referral Order to Outpatient Physical Therapy   Standing Status: Future   Referral Priority: Routine Referral Type: Rehab Therapy Physical Therapy   Number of Visits Requested: 1     Reason for your hospital stay   Order Comments: Total knee arthroplasty with Dr. Espinoza     When to call - Contact Surgeon Team   Order Comments: You may experience symptoms that require follow-up before your scheduled appointment. Refer to the \"Stoplight Tool\" for instructions on when to contact your Surgeon Team if you are concerned about pain control, blood clots, constipation, or if you are unable to urinate.     When to call - Reach out to Urgent Care   Order Comments: If you are not able to reach your Surgeon Team and you need immediate care, go to the Orthopedic Walk-in Clinic or Urgent Care at your Surgeon's office.  Do NOT go to the Emergency Room " unless you have shortness of breath, chest pain, or other signs of a medical emergency.     When to call - Reasons to Call 911   Order Comments: Call 911 immediately if you experience sudden-onset chest pain, arm weakness/numbness, slurred speech, or shortness of breath     Discharge Instruction - Breathing exercises   Order Comments: Perform breathing exercises using your Incentive Spirometer 10 times per hour while awake for 2 weeks.     Symptoms - Fever Management   Order Comments: A low grade fever can be expected after surgery.  Use acetaminophen (TYLENOL) as needed for fever management.  Contact your Surgeon Team if you have a fever greater than 101.5 F, chills, and/or night sweats.     Symptoms - Constipation management   Order Comments: Constipation (hard, dry bowel movements) is expected after surgery due to the combination of being less active, the anesthetic, and the opioid pain medication.  You can do the following to help reduce constipation:  ~  FLUIDS:  Drink clear liquids (water or Gatorade), or juice (apple/prune).  ~  DIET:  Eat a fiber rich diet.    ~  ACTIVITY:  Get up and move around several times a day.  Increase your activity as you are able.  MEDICATIONS:  Reduce the risk of constipation by starting medications before you are constipated.  You can take Miralax   (1 packet as directed) and/or a stool softener (Senokot 1-2 tablets 1-2 times a day).  If you already have constipation and these medications are not working, you can get magnesium citrate and use as directed.  If you continue to have constipation you can try an over the counter suppository or enema.  Call your Surgeon Team if it has been greater than 3 days since your last bowel movement.     Symptoms - Reduced Urine Output   Order Comments: Changes in the amount of fluids you drank before and after surgery may result in problems urinating.  It is important to stay well-hydrated after surgery and drink plenty of water. If it has been  greater than 8 hours since you have urinated despite drinking plenty of water, call your Surgeon Team.     Activity - Exercises to prevent blood clots   Order Comments: Unless otherwise directed by your Surgeon team, perform the following exercises at least three times per day for the first four weeks after surgery to prevent blood clots in your legs: 1) Point and flex your feet (Ankle Pumps), 2) Move your ankle around in big circles, 3) Wiggle your toes, 4) Walk, even for short distances, several times a day, will help decrease the risk of blood clots.     Order Specific Question Answer Comments   Is discharge order? Yes      Comfort and Pain Management - Pain after Surgery   Order Comments: Pain after surgery is normal and expected.  You will have some amount of pain for several weeks after surgery.  Your pain will improve with time.  There are several things you can do to help reduce your pain including: rest, ice, elevation, and using pain medications as needed. Contact your Surgeon Team if you have pain that persists or worsens after surgery despite rest, ice, elevation, and taking your medication(s) as prescribed. Contact your Surgeon Team if you have new numbness, tingling, or weakness in your operative extremity.     Comfort and Pain Management - Swelling after Surgery   Order Comments: Swelling and/or bruising of the surgical extremity is common and may persist for several months after surgery. In addition to frequent icing and elevation, gentle compressive support with an ACE wrap or tubigrip may help with swelling. Apply compression regularly, removing at least twice daily to perform skin checks. Contact your Surgeon Team if your swelling increases and is NOT associated with an increase in your activity level, or if your swelling increases and is associated with redness and pain.     Comfort and Pain Management - LOWER Extremity Elevation   Order Comments: Swelling is expected for several months after  "surgery. This type of swelling is usually associated with gravity and activity, and can be improved with elevation.   The best way to do this is to get your \"toes above your nose\" by laying down and placing several pillows lengthwise under your calf and heel. When elevating your leg keep your knee completely straight. Perform this elevation as often as possible especially for the first two weeks after surgery.     Comfort and Pain Management - Cold therapy   Order Comments: Ice can be used to control swelling and discomfort after surgery. Place a thin towel over your operative site and apply the ice pack overtop. Leave ice pack in place for 20 minutes, then remove for 20 minutes. Repeat this 20 minutes on/20 minutes off routine as often as tolerated.     Medication Instructions - Acetaminophen (TYLENOL) Instructions   Order Comments: You were discharged with acetaminophen (TYLENOL) for pain management after surgery. Acetaminophen most effectively manages pain symptoms when it is taken on a schedule without missing doses (every four, six, or eight hours). Your Provider will prescribe a safe daily dose between 3000 - 4000 mg.  Do NOT exceed this daily dose. Most patients use acetaminophen for pain control for the first four weeks after surgery.  You can wean from this medication as your pain decreases.     Medication Instructions - NSAID Instructions   Order Comments: You were discharged with an anti-inflammatory medication for pain management to use in combination with acetaminophen (TYLENOL) and the narcotic pain medication.  Take this medication exactly as directed.  You should only take one anti-inflammatory at a time.  Some common anti-inflammatories include: ibuprofen (ADVIL, MOTRIN), naproxen (ALEVE, NAPROSYN), celecoxib (CELEBREX), meloxicam (MOBIC), ketorolac (TORADOL).  Take this medication with food and water.     Medication Instructions - Opioids - Tapering Instructions   Order Comments: In the first three " days following surgery, your symptoms may warrant use of the narcotic pain medication every four to six hours as prescribed. This is normal. As your pain symptoms improve, focus your efforts on decreasing (tapering) use of narcotic medications. The most successful tapering strategy is to first, decrease the number of tablets you take every 4-6 hours to the minimum prescribed. Then, increase the amount of time between doses.  For example:  First, taper to   or 1 tablet every 4-6 hours.  Then, taper to   or 1 tablet every 6-8 hours.  Then, taper to   or 1 tablet every 8-10 hours.  Then, taper to   or 1 tablet every 10-12 hours.  Then, taper to   or 1 tablet at bedtime.  The bedtime dose can help with comfort during sleep and is typically the last dose to be discontinued after surgery.     Follow Up Care   Order Comments: Follow-up with your Surgeon Team in 2 weeks for wound check.     Physical Therapy Instructions   Order Comments: Begin physical therapy within one week following surgery.     Activity - Total Knee Arthroplasty     Order Specific Question Answer Comments   Is discharge order? Yes      Return to Driving   Order Comments: Return to driving - Driving is NOT permitted until directed by your provider. Under no circumstance are you permitted to drive while using narcotic pain medications.     Order Specific Question Answer Comments   Is discharge order? Yes      Return to athletic activities   Order Comments: Return to athletic activities- Activities such as swimming, bicycling, jogging, running, and stop-and-go sports should be avoided until permitted by your Provider.     Order Specific Question Answer Comments   Is discharge order? Yes      Return to School / Work   Order Comments: Return to School / Work: You may return to work/school when directed by your Provider.     Order Specific Question Answer Comments   Is discharge order? Yes      NO Precautions   Order Comments: No precautions directed by your  Provider.  You may perform range of motion activities as tolerated.     Order Specific Question Answer Comments   Is discharge order? Yes      Weight bearing as tolerated   Order Comments: Weight bearing as tolerated on your operative extremity.     Order Specific Question Answer Comments   Is discharge order? Yes      Dressing / Wound Care - Wound   Order Comments: You have a clean dressing on your surgical wound. Dressing change instructions as follows: Remove dressing post operative day 7. OK to shower with dressing in place, letting water run over the dressing. Do NOT soak. After dressing is removed, OK to shower an let water run over incision. Do not soak or scrub. Pat dry immediately after. Contact your Surgeon Team if you have increased redness, warmth around the surgical wound, and/or drainage from the surgical wound.     Dressing / Wound Care - NO Tub Bathing   Order Comments: Tub bathing, swimming, or any other activities that will cause your incision to be submerged in water should be avoided until allowed by your Surgeon.     Medication instructions -  Anticoagulation - aspirin   Order Comments: Take the aspirin as prescribed for a total of four weeks after surgery.  This is given to help minimize your risk of blood clot.     Medication Instructions - Opioid Instructions (Greater than or equal to 65 years)   Order Comments: You were discharged with an opioid medication (hydromorphone, oxycodone, hydrocodone, or tramadol). This medication should only be taken for breakthrough pain that is not controlled with acetaminophen (TYLENOL). If you rate your pain less than 3 you do not need this medication.  Pain rating 0-3:  You do not need this medication  Pain rating 4-6:  Take 1/2 tablet every 4-6 hours as needed  Pain rating 7-10:  Take 1 tablet every 4-6 hours as needed  Do not exceed 4 tablets per day     Dressing Wound Care - Shower with wound/dressing NOT covered   Order Comments: After dressing is  removed, you do not need to cover your dressing or incision in the shower, you may allow water and soap to run over top of the surgical dressing or incision. You are strictly prohibited from soaking or submerging the surgical wound underwater.     Activity   Order Comments: Your activity upon discharge: activity as tolerated     Order Specific Question Answer Comments   Is discharge order? Yes      When to contact your care team   Order Comments: Call Dr. Asencio if you have any of the following: temperature greater than 101.3  or less than 96.5,  increased shortness of breath, increased drainage, increased swelling, or increased pain.    Contact phone number is      Wound care and dressings   Order Comments: Instructions to care for your wound at home: ice to area for comfort, keep wound clean and dry, may get incision wet in shower but do not soak or scrub, reinforce dressing as needed, and remove dressing in 7 days.     Reason for your hospital stay   Order Comments: Delmis Parrish is a 72 year old female s/p L TKA on 3/28/2023 with Dr. Espinoza     Adult [facility] Follow-up and recommended labs and tests   Order Comments: Follow up with Dr Asencio as scheduled.      Appointments at [facility] at [address] [place] [address] ([facility])     Call [phone] if you haven't heard regarding these appointments within 7 days of discharge.     Crutches DME   Order Comments: DME Documentation: Describe the reason for need to support medical necessity: Impaired gait status post knee surgery. I, the undersigned, certify that the above prescribed supplies are medically necessary for this patient and is both reasonable and necessary in reference to accepted standards of medical practice in the treatment of this patient's condition and is not prescribed as a convenience.     Order Specific Question Answer Comments   DME Provider: [employer]    Crutch Type: Standard     Crutches Add On: NA    Length of Need: Lifetime      Cane DME   Order Comments: DME Documentation: Describe the reason for need to support medical necessity: Impaired gait status post knee surgery. I, the undersigned, certify that the above prescribed supplies are medically necessary for this patient and is both reasonable and necessary in reference to accepted standards of medical practice in the treatment of this patient's condition and is not prescribed as a convenience.     Order Specific Question Answer Comments   DME Provider: [employer]    Cane Type: Single Tip    Reminder: Patient can typically get 1 every 5 years      Walker DME   Order Comments: DME Documentation: Describe the reason for need to support medical necessity: Impaired gait status post knee surgery. I, the undersigned, certify that the above prescribed supplies are medically necessary for this patient and is both reasonable and necessary in reference to accepted standards of medical practice in the treatment of this patient's condition and is not prescribed as a convenience.     Order Specific Question Answer Comments   DME Provider: [employer]    Walker Type: Standard (2 Wheel)    Accessories: N/A      Discharge Instruction - Regular Diet Adult   Order Comments: Return to your pre-surgery diet unless instructed otherwise     Order Specific Question Answer Comments   Is discharge order? Yes      Diet   Order Comments: Follow this diet upon discharge: Orders Placed This Encounter      Advance Diet as Tolerated: Regular Diet Adult      Discharge Instruction - Regular Diet Adult     Order Specific Question Answer Comments   Is discharge order? Yes        Norma Betts PGY4  Orthopedic Surgery

## 2023-03-30 ENCOUNTER — HOSPITAL ENCOUNTER (EMERGENCY)
Facility: HOSPITAL | Age: 73
Discharge: HOME | End: 2023-03-30
Attending: EMERGENCY MEDICINE | Admitting: EMERGENCY MEDICINE
Payer: COMMERCIAL

## 2023-03-30 VITALS
TEMPERATURE: 97.5 F | DIASTOLIC BLOOD PRESSURE: 66 MMHG | WEIGHT: 175 LBS | OXYGEN SATURATION: 92 % | RESPIRATION RATE: 20 BRPM | HEART RATE: 68 BPM | BODY MASS INDEX: 26.52 KG/M2 | HEIGHT: 68 IN | SYSTOLIC BLOOD PRESSURE: 103 MMHG

## 2023-03-30 DIAGNOSIS — Z48.89 ENCOUNTER FOR POST SURGICAL WOUND CHECK: ICD-10-CM

## 2023-03-30 PROCEDURE — 99282 EMERGENCY DEPT VISIT SF MDM: CPT

## 2023-03-30 ASSESSMENT — ACTIVITIES OF DAILY LIVING (ADL): ADLS_ACUITY_SCORE: 35

## 2023-03-31 NOTE — ED TRIAGE NOTES
Pt here d/t bleeding from bottom of her dressing. Had knee [place] on Monday [place]. Has home bandage and wrap in place. Knee is more warm and swollen as compared to other. Pt afebrile. States she has some pain when she bears weight, but not when resting. Has been taking tylenol, robaxin and oxycodone as needed. Tylenol taken at 1800, Oxy taken at 1900. No robaxin recently.      Triage Assessment     Row Name 03/30/23 1954       Triage Assessment (Adult)    Airway WDL WDL

## 2023-03-31 NOTE — ED NOTES
PT reports that she first noted the bleeding at 1000 this morning. She reports that she is still able to get around using her walker. She denies dizziness. Denies n/v or feeling feverish. Denies any recent falls. She is calm and able to speak in complete sentences. She is A&Ox4. She can express needs. She has not had to replace any of the bandaging. There is noticeable blood in the bandaging below the knee but it is controlled. She denies pain while resting and 7/10 when walking.

## 2023-03-31 NOTE — ED PROVIDER NOTES
"EMERGENCY DEPARTMENT ENCOUNTER      NAME: Delmis Parrish  AGE: 72 year old female  YOB: 1950  MRN: [id number]  EVALUATION DATE & TIME: No admission date for patient encounter.    PCP: Monie Dockery    ED PROVIDER: Slade Lama M.D.      Chief Complaint   Patient presents with     Post-op Problem     L knee         FINAL IMPRESSION:  Postoperative wound check      ED COURSE & MEDICAL DECISION MAKING:    Pertinent Labs & Imaging studies reviewed. (See chart for details)  72 year old female presents to the Emergency Department for evaluation of bleeding associated with left knee replacement.  Patient had surgery 2 days ago.  Reports \"bleeding through the dressing tonight\".  Denies any overexertion's or injuries.  Reports only mild discomfort.  Examination reveals wound edges well approximated.  No evidence of active bleeding.  Inferior aspect of wound with potentially 5 to 10 cc of blood within the dressing.  Patient reassured in regards to findings.  Dressing reinforced.  Patient encouraged to follow-up with her orthopedic surgeon in the morning.  Patient appears non toxic with stable vitals signs. Overall exam is benign.        8:24 PM  I met with the patient for the initial interview and physical examination. Discussed plan for treatment and workup in the ED.      At the conclusion of the encounter I discussed the results of all of the tests and the disposition. The questions were answered and return precautions provided. The patient or family acknowledged understanding and was agreeable with the care plan.     Medical Decision Making    History:    Supplemental history from: Documented in chart, if applicable    External Record(s) reviewed: Documented in chart, if applicable.    Work Up:    Chart documentation includes differential considered and any EKGs or imaging independently interpreted by provider, where specified.    In additional to work up documented, I considered the following work up: " Documented in chart, if applicable.    External consultation:    Discussion of management with another provider: Documented in chart, if applicable    Complicating factors:    Care impacted by chronic illness: Hypertension    Care affected by social determinants of health: N/A    Disposition considerations: Discharge. No recommendations on prescription strength medication(s). See documentation for any additional details.      PPE: Provider wore gloves, N95 mask, eye protection, surgical cap, and paper mask.     MEDICATIONS GIVEN IN THE EMERGENCY:  Medications - No data to display    NEW PRESCRIPTIONS STARTED AT TODAY'S ER VISIT  New Prescriptions    No medications on file          =================================================================    HPI    Patient information was obtained from: patient     Use of Intrepreter: N/A         Delmis Parrish is a 72 year old female with a pertient medical history of hypertension, lumbar spondylosis, spinal cord stimulator status, osteoarthritis, s/p left knee arthroplasty (3/27/23) who presents to the ED for evaluation of post op problem.     Per chart review: patient was admitted to the [facility] following a left knee arthroplasty on 3/27. Patient did well post op. Pain controlled and patient was discharged after meeting PT/OT goals for mobility. She was discharged continuing ASA for 4 weeks.     Patient reports since left knee surgery on 3/27/23 the wound has had increased bleeding. Tonight, the patient notes she had to add an additional three bandages to control the bleeding. She also notes the knee is more warm and swollen compared to the other. The patient took oxycodone at 7pm. She denies any other complaints at this time.     REVIEW OF SYSTEMS   Constitutional:  Denies fever, chills  Respiratory:  Denies productive cough or increased work of breathing  Cardiovascular:  Denies chest pain, palpitations  GI:  Denies abdominal pain, nausea, vomiting, or  change in bowel or bladder habits   Musculoskeletal:  Positive for left knee swelling and increased bleeding  Skin:  Denies rash   Neurologic:  Denies focal weakness  All systems negative except as marked.     PAST MEDICAL HISTORY:  Past Medical History:   Diagnosis Date     Gastroesophageal reflux disease      Generalized anxiety disorder      Insomnia      LVH (left ventricular hypertrophy)      Major depressive disorder, recurrent episode, mild (H)      Renovascular hypertension        PAST SURGICAL HISTORY:  Past Surgical History:   Procedure Laterality Date     ARTHROPLASTY KNEE Left 3/27/2023    Procedure: ARTHROPLASTY, KNEE, TOTAL LEFT;  Surgeon: Tee Espinoza MD;  Location: [facility] OR     BIOPSY BONE LOWER EXTREMITY Left 08/08/2022    Procedure: Biopsy Left Tibia;  Surgeon: Tee Espinoza MD;  Location: [facility] OR     CHOLECYSTECTOMY       CURETTAGE AND CEMENTATION LOWER EXTREMITY, COMBINED Left 08/08/2022    Procedure: With Curettage and Allograft Placement;  Surgeon: Tee Espinoza MD;  Location: [facility] OR     EXC TUMR SOFT TISSUE BACK/FLANK   2006     FLUOROGUIDE FOR SPINE INJECT   2008     HYSTERECTOMY           CURRENT MEDICATIONS:      Current Facility-Administered Medications:      lidocaine (PF) (XYLOCAINE) 1 % injection 9 mL, 9 mL, , , Tee Espinoza MD, 9 mL at 10/12/22 1442     triamcinolone (KENALOG-40) injection 40 mg, 40 mg, , , Tee Espinoza MD, 40 mg at 10/12/22 1442    Current Outpatient Medications:      acetaminophen (TYLENOL) 325 MG tablet, Take 2 tablets (650 mg) by mouth every 4 hours as needed for other, Disp: 60 tablet, Rfl: 0     albuterol (PROAIR HFA/PROVENTIL HFA/VENTOLIN HFA) 108 (90 Base) MCG/ACT inhaler, INHALE 2 PUFFS BY MOUTH EVERY 6 HOURS AS NEEDED FOR WHEEZE, Disp: , Rfl:      amLODIPine (NORVASC) 5 MG tablet, Take 1 tablet by mouth 2 times daily, Disp: , Rfl:      aspirin (ASA) 81 MG EC tablet, Take 1 tablet (81 mg) by mouth 2  times daily, Disp: 60 tablet, Rfl: 0     celecoxib (CELEBREX) 50 MG capsule, Take  mg by mouth daily as needed, Disp: , Rfl:      cholecalciferol (VITAMIN D3) 25 mcg (1000 units) capsule, Take 1 capsule by mouth daily, Disp: , Rfl:      estradiol (VAGIFEM) 10 MCG TABS vaginal tablet, Place 10 mcg vaginally twice a week, Disp: , Rfl:      fish oil-omega-3 fatty acids 1000 MG capsule, Take 1 capsule by mouth, Disp: , Rfl:      FLUoxetine (PROZAC) 10 MG tablet, Take 15 mg by mouth At Bedtime, Disp: , Rfl:      GABAPENTIN 8%/VANICREAM (FV COMPOUNDED) CREAM, Apply 1-2 g topically 2 times daily Apply topically to feet, Disp: , Rfl:      glucosamine-chondroitin 500-400 MG CAPS per capsule, daily, Disp: , Rfl:      LANsoprazole (PREVACID) 30 MG DR capsule, TAKE 1 CAPSULE BY MOUTH EVERY DAY, Disp: , Rfl:      LORazepam (ATIVAN) 0.5 MG tablet, TAKE 1 TABLET BY MOUTH EVERY DAY AS NEEDED FOR ANXIETY, Disp: , Rfl:      methocarbamol (ROBAXIN) 500 MG tablet, Take 1 tablet (500 mg) by mouth 4 times daily, Disp: 40 tablet, Rfl: 0     nortriptyline (PAMELOR) 10 MG capsule, TAKE 4 CAPSULES BY MOUTH DAILY AT BEDTIME., Disp: , Rfl:      ondansetron (ZOFRAN ODT) 4 MG ODT tab, Take 4 mg by mouth as needed, Disp: , Rfl:      oxyCODONE (ROXICODONE) 5 MG tablet, Take 1 tablet (5 mg) by mouth every 4 hours as needed for moderate pain (4-6), Disp: 26 tablet, Rfl: 0     polyethylene glycol (MIRALAX) 17 g packet, Take 17 g by mouth daily, Disp: 10 packet, Rfl: 0     pregabalin (LYRICA) 100 MG capsule, Take 200 mg by mouth daily at bedtime, Disp: , Rfl:      pregabalin (LYRICA) 25 MG capsule, Take 25 mg by mouth daily at bedtime, Disp: , Rfl:      senna-docusate (SENOKOT-S/PERICOLACE) 8.6-50 MG tablet, Take 1 tablet by mouth 2 times daily, Disp: 30 tablet, Rfl: 0     zolpidem (AMBIEN) 10 MG tablet, TAKE 1 TABLET BY MOUTH EVERYDAY AT BEDTIME, Disp: , Rfl:     ALLERGIES:  Allergies   Allergen Reactions     Adhesive Tape      Paroxetine  "Nausea and Vomiting     aka paxil       Sertraline Nausea and Vomiting     aka zoloft       Tartrazine Nausea and Vomiting     aka darvon         FAMILY HISTORY:  No family history on file.    SOCIAL HISTORY:   Social History     Socioeconomic History     Marital status:    Tobacco Use     Smoking status: Never     Smokeless tobacco: Never   Substance and Sexual Activity     Alcohol use: Not Currently     Drug use: Never       VITALS:  Patient Vitals for the past 24 hrs:   BP Temp Temp src Pulse Resp SpO2 Height Weight   03/30/23 2012 -- -- -- 79 20 91 % -- --   03/30/23 2000 117/73 -- -- -- -- -- -- --   03/30/23 1953 121/71 97.5  F (36.4  C) Oral 92 20 92 % 1.727 m (5' 8\") 79.4 kg (175 lb)        PHYSICAL EXAM    Constitutional:  Awake, alert, in no apparent distress  HENT:  Normocephalic, Atraumatic. Bilateral external ears normal. Oropharynx moist. Nose normal. Neck- Normal range of motion with no guarding,  Supple, No stridor.   Eyes:  PERRL, EOMI with no signs of entrapment, Conjunctiva normal, No discharge.   Musculoskeletal:  No edema. Good range of motion in all major joints. No tenderness to palpation or major deformities noted. Surgical incision over left knee well approximated. No spreading erythema. No active bleeding.   Integument:  Warm, Dry, No erythema, No rash.   Neurologic:  Alert & oriented, Normal motor function, Normal sensory function, No focal deficits noted.   Psychiatric:  Affect normal, Judgment normal, Mood normal.     LAB:  All pertinent labs reviewed and interpreted.       RADIOLOGY:  Reviewed all pertinent imaging. Please see official radiology report.  No orders to display     PROCEDURES:   None       I, Radha Alfaro, am serving as a scribe to document services personally performed by Slade Lama MD, based on my observation and the provider's statements to me. I, Slade Lama MD attest that Radha Alfaro is acting in a scribe capacity, has observed my performance of the " services and has documented them in accordance with my direction.    Slade Lama M.D.  Emergency Medicine  [facility] EMERGENCY DEPARTMENT     Slade Lama MD  03/30/23 2100

## 2023-04-12 ENCOUNTER — OFFICE VISIT (OUTPATIENT)
Dept: ORTHOPEDICS | Facility: CLINIC | Age: 73
End: 2023-04-12
Payer: COMMERCIAL

## 2023-04-12 DIAGNOSIS — Z96.652 HISTORY OF TOTAL KNEE ARTHROPLASTY, LEFT: Primary | ICD-10-CM

## 2023-04-12 PROCEDURE — 99024 POSTOP FOLLOW-UP VISIT: CPT | Mod: GC | Performed by: ORTHOPAEDIC SURGERY

## 2023-04-12 NOTE — NURSING NOTE
Reason For Visit:   Chief Complaint   Patient presents with     Surgical Followup     2 wk post-op left TKA DOS 3/27/23 // been doing PT       There were no vitals taken for this visit.    Pain Assessment  Patient Currently in Pain: Yes  0-10 Pain Scale: 4 (with walking)  Primary Pain Location: Knee (left)      Rafael Rodriguez ATC

## 2023-04-12 NOTE — PROGRESS NOTES
Orthopedic Clinic Follow Up     CHIEF CONCERN:  Follow up left knee arthroplasty 3/27/2023    HISTORY OF PRESENT ILLNESS:  Patient presents for 2 week follow up of left total knee arthroplasty. She overall has been doing well. She was seen in the ED on 3/30 due to concern for bleeding from the distal aspect of the incision. Dressing was removed at this time, no active drainage noted. No further bleeding or drainage. She has been doing home PT, feels this has been going well. ROM and pain continues to improve. She is only taking Tylenol for pain. No new numbness or tingling.     PHYSICAL EXAM:    General: Well appearing female, does not appear in pain. Interactive, pleasant  MSK: LLE  Incision healing well. One 1cm aspect of skin over lap at distal incision, no surrounding erythema, no drainage.   ROM 0-115  Stable to varus and valgus  Fires TA, GSC, EHL, FHL  SILT in DP, SP, tibial, sural, saphenous distributions   Extremity wwp     IMAGING:  XR from 3/27/2023 reviewed. These demonstrate implants in proper position.     ASSESSMENT:  73 YO F 2 weeks s/p L TKA. Doing well    PLAN:  Continue with PT  Tylenol for pain control  OK to drive if not on narcotics  Discussed gradual return to activity. Would like incision to be fully healed prior to resuming horseback riding, swimming.   Will follow up with video visit in 2 months, will address activity progression at this time     The patient was seen and plan was discussed with Orthopedic Staff Surgeon, Dr. Alexis Betts PGY4  Orthopedic Surgery

## 2023-04-12 NOTE — LETTER
4/12/2023         RE: Delmis Parrish  [address]  [place] [address]        Dear Colleague,    Thank you for referring your patient, Delmis Parrish, to the [facility] [place]. Please see a copy of my visit note below.    Orthopedic Clinic Follow Up     CHIEF CONCERN:  Follow up left knee arthroplasty 3/27/2023    HISTORY OF PRESENT ILLNESS:  Patient presents for 2 week follow up of left total knee arthroplasty. She overall has been doing well. She was seen in the ED on 3/30 due to concern for bleeding from the distal aspect of the incision. Dressing was removed at this time, no active drainage noted. No further bleeding or drainage. She has been doing home PT, feels this has been going well. ROM and pain continues to improve. She is only taking Tylenol for pain. No new numbness or tingling.     PHYSICAL EXAM:    General: Well appearing female, does not appear in pain. Interactive, pleasant  MSK: LLE  Incision healing well. One 1cm aspect of skin over lap at distal incision, no surrounding erythema, no drainage.   ROM 0-115  Stable to varus and valgus  Fires TA, GSC, EHL, FHL  SILT in DP, SP, tibial, sural, saphenous distributions   Extremity wwp     IMAGING:  XR from 3/27/2023 reviewed. These demonstrate implants in proper position.     ASSESSMENT:  73 YO F 2 weeks s/p L TKA. Doing well    PLAN:  Continue with PT  Tylenol for pain control  OK to drive if not on narcotics  Discussed gradual return to activity. Would like incision to be fully healed prior to resuming horseback riding, swimming.   Will follow up with video visit in 2 months, will address activity progression at this time     The patient was seen and plan was discussed with Orthopedic Staff Surgeon, Dr. Alexis Betts PGY4  Orthopedic Surgery     I was present with the resident during the history and exam.  I discussed the case with the resident and agree with the findings as documented in  the assessment and plan.          Tee Espinoza MD

## 2023-04-24 ENCOUNTER — TELEPHONE (OUTPATIENT)
Dept: ORTHOPEDICS | Facility: CLINIC | Age: 73
End: 2023-04-24
Payer: COMMERCIAL

## 2023-04-24 NOTE — TELEPHONE ENCOUNTER
[facility] Call Center    Phone Message    May a detailed message be left on voicemail: yes     Reason for Call: Other: Delmis stepped off a curb and fell on 4/22/23. Her knee is black and blue and it feels like it is going to give out and is very painful. Please call to discuss     Action Taken: Other: [facility] [place] [facility]    Travel Screening: Not Applicable

## 2023-04-24 NOTE — TELEPHONE ENCOUNTER
Patient was called back.  She stated that she was at a basketball game on Saturday 4/22/23 and went to step off of the curb and on to her left knee and she felt that her knee gave out and she fell on her left knee.  She states that she is able to walk and is using her walker just for extra stability.  She is bruised and has been icing.  She os going to PT tomorrow and would like to wait to make an appointment until after she sees them.  She will call us back if it is determined that she should be seen. She can be scheduled on Wed 4/26/23 with Dr. Espinoza.  She can be scheduled at 8:00am it the time slot is still available.

## 2023-06-14 ENCOUNTER — TELEPHONE (OUTPATIENT)
Dept: ORTHOPEDICS | Facility: CLINIC | Age: 73
End: 2023-06-14

## 2023-06-14 ENCOUNTER — VIRTUAL VISIT (OUTPATIENT)
Dept: ORTHOPEDICS | Facility: CLINIC | Age: 73
End: 2023-06-14
Payer: COMMERCIAL

## 2023-06-14 DIAGNOSIS — Z96.652 HISTORY OF TOTAL KNEE ARTHROPLASTY, LEFT: Primary | ICD-10-CM

## 2023-06-14 PROCEDURE — 99024 POSTOP FOLLOW-UP VISIT: CPT | Performed by: ORTHOPAEDIC SURGERY

## 2023-06-14 NOTE — NURSING NOTE
Reason For Visit:   Chief Complaint   Patient presents with     RECHECK     Progress check left knee // been doing PT // had a injury at end of April and been having swelling        There were no vitals taken for this visit.    Pain Assessment  Patient Currently in Pain: Yes  0-10 Pain Scale: 1  Primary Pain Location: Knee (left)      Rafael Rodriguez, ARON

## 2023-06-14 NOTE — PROGRESS NOTES
Delmis is a 72 year old who is being evaluated via a billable telephone visit.      What phone number would you like to be contacted at? [phone]  How would you like to obtain your AVS? [employer]    Distant Location (provider location):  On-site      Phone call duration: 12 minutes    This 72-year-old is almost 3 months out from a left total knee arthroplasty. She had 2 falls since surgery which resulted in knee swelling. The last was 3 weeks ago. She is getting gradually better. She has swelling down to the foot.  She is still using a cane to help her walk smoothly.  Without the cane she is able to walk.  She participating with physical therapy.    Plan is to have her use a compression stocking above the knee.  Should continue with physical therapy.  I would also like to get an x-ray of the left knee and of the tibia to make sure has not injured herself given the recent falls.  We will report back to her the results of the x-ray.

## 2023-06-14 NOTE — TELEPHONE ENCOUNTER
Patient was called back and a message was left.      The xr orders are in her chart. The imaging scheduling number was left for her to call and schedule when it works for her.

## 2023-06-14 NOTE — LETTER
6/14/2023         RE: Delmis Parrish  [address]  [facility] [address]        Dear Colleague,    Thank you for referring your patient, Delmis Parrish, to the [facility] [place]. Please see a copy of my visit note below.    Delmis is a 72 year old who is being evaluated via a billable telephone visit.      What phone number would you like to be contacted at? [phone]  How would you like to obtain your AVS? MyChart    Distant Location (provider location):  On-site      Phone call duration: 12 minutes    This 72-year-old is almost 3 months out from a left total knee arthroplasty. She had 2 falls since surgery which resulted in knee swelling. The last was 3 weeks ago. She is getting gradually better. She has swelling down to the foot.  She is still using a cane to help her walk smoothly.  Without the cane she is able to walk.  She participating with physical therapy.    Plan is to have her use a compression stocking above the knee.  Should continue with physical therapy.  I would also like to get an x-ray of the left knee and of the tibia to make sure has not injured herself given the recent falls.  We will report back to her the results of the x-ray.      Tee Espinoza MD

## 2023-06-14 NOTE — TELEPHONE ENCOUNTER
[facility] Call Center    Phone Message    May a detailed message be left on voicemail: yes     Reason for Call: Other: Pt needs order for imaging to schedule     Action Taken: Other: ortho     Travel Screening: Not Applicable

## 2023-06-15 ENCOUNTER — ANCILLARY PROCEDURE (OUTPATIENT)
Dept: GENERAL RADIOLOGY | Facility: CLINIC | Age: 73
End: 2023-06-15
Attending: ORTHOPAEDIC SURGERY
Payer: COMMERCIAL

## 2023-06-15 DIAGNOSIS — Z96.652 HISTORY OF TOTAL KNEE ARTHROPLASTY, LEFT: ICD-10-CM

## 2023-06-15 PROCEDURE — 73590 X-RAY EXAM OF LOWER LEG: CPT | Mod: TC | Performed by: RADIOLOGY

## 2023-06-15 PROCEDURE — 73560 X-RAY EXAM OF KNEE 1 OR 2: CPT | Mod: TC | Performed by: RADIOLOGY

## 2023-06-20 NOTE — TELEPHONE ENCOUNTER
"Per Dr. Espinoza \"x-rays look normal and have encouraged her to continue to wear her compression stockings and work with her therapist.  Her next x-rays would be at her 1 year silvana so in about 9 months\"    Orders are in and follow up will be made.     "

## 2023-08-31 ASSESSMENT — KOOS JR
STANDING UPRIGHT: MILD
HOW SEVERE IS YOUR KNEE STIFFNESS AFTER FIRST WAKING IN MORNING: SEVERE
TWISING OR PIVOTING ON KNEE: MODERATE
STRAIGHTENING KNEE FULLY: MODERATE
RISING FROM SITTING: MODERATE
KOOS JR SCORING: 54.84
BENDING TO THE FLOOR TO PICK UP OBJECT: SEVERE

## 2023-09-01 ENCOUNTER — ANCILLARY PROCEDURE (OUTPATIENT)
Dept: GENERAL RADIOLOGY | Facility: CLINIC | Age: 73
End: 2023-09-01
Attending: ORTHOPAEDIC SURGERY
Payer: COMMERCIAL

## 2023-09-01 ENCOUNTER — OFFICE VISIT (OUTPATIENT)
Dept: ORTHOPEDICS | Facility: CLINIC | Age: 73
End: 2023-09-01
Payer: COMMERCIAL

## 2023-09-01 DIAGNOSIS — Z96.652 HISTORY OF TOTAL KNEE ARTHROPLASTY, LEFT: Primary | ICD-10-CM

## 2023-09-01 DIAGNOSIS — Z96.652 HISTORY OF TOTAL KNEE ARTHROPLASTY, LEFT: ICD-10-CM

## 2023-09-01 PROCEDURE — 99213 OFFICE O/P EST LOW 20 MIN: CPT | Mod: GC | Performed by: ORTHOPAEDIC SURGERY

## 2023-09-01 PROCEDURE — 73560 X-RAY EXAM OF KNEE 1 OR 2: CPT | Mod: LT | Performed by: RADIOLOGY

## 2023-09-01 NOTE — PROGRESS NOTES
ORTHOPAEDIC SURGERY CLINIC FOLLOW UP    Diagnosis: Left knee osteoarthritis   Surgery: Left TKA  DOS: 3/27/23    S: Delmis Parrish is a 73 year old female now 5 months now s/p the above procedure presenting for follow up. Today patient reports she continues to make very slow but gradual improvement.  Does have pain with certain motions, most significant along her medial knee.  She is seeing physical therapy weekly.  She has 2 major concerns to discuss today.  At a recent physical therapy appointment, patient was describing her medial knee pain.  The therapist did an exam and thought she may have some laxity with valgus stress.  She also notes there have been occasions where her left knee buckled while she is walking causing her to fall.  This happened fairly regularly in the past although she has not had any knee buckling in the last week and attributes this to her strengthening with physical therapy.  She is ambulating without assistive devices.  Taking Tylenol only for pain.  Overall happy with how things are proceeding, just feels they are improving very slowly.    PE:   A&O, NAD. Pleasant, articulate.  Respirations non-labored on room air.  Nonantalgic gait  RLE: Surgical incision well-healed.  Patient has excellent range of motion from 0-130.  No mild swelling about the knee. Does appear to be a small amount of laxity with valgus stress at 30 degrees of knee flexion but no pain.  Otherwise knee stable to valgus stress at 0 degrees of knee flexion and varus stress at 0 and 30 degrees of knee flexion.  Grossly neurovascularly intact distally.    IMAGING: X-rays today demonstrate well seated left total knee arthroplasty.  Able alignment.  No periprosthetic fractures or signs of loosening.    A/P: Delmis Parrish 73 year old female now 5 wks now s/p the above procedure.  Progressing slowly but steadily. Small amount of laxity with valgus stress could be physiologic, cannot entirely rule out a MCL strain/sprain from a  fall shortly after surgery. Regardless, because the patient continues to make steady progress, will continue to observe.  Anticipate will fully recover and this will become a nonissue    -Continue physical therapy with emphasis on quadricep muscle strength.  -Rest, ice, OTC meds as needed  -Follow-up 1 year postop (March 2024) repeat x-rays or if concerns arise    Patient expressed understanding and is in agreement with this plan.      Patient seen and discussed with Dr. Espinoza.    Negrito Abbasi MD  Orthopaedic Surgery, PGY-4  Pager: [phone]

## 2023-09-01 NOTE — PROGRESS NOTES
ORTHOPAEDIC SURGERY CLINIC FOLLOW UP    Diagnosis: ***  Surgery: ***  DOS: ***    CC: Routine follow up    S: Delmis Parrish is a 73 year old female now *** wks now s/p the above procedure presenting for follow up. Today      ROS: *** Otherwise a 10 point review of systems was conducted and found to be negative.     Social: Patient lives in *** in a *** with ***. ***    PE:   A&O, NAD. Pleasant, articulate.  Respirations non-labored on room air.  ***UE: ***No deformity, skin intact. No significant tenderness to palpation over clavicle, AC joint, shoulder, arm, elbow, forearm, wrist. Normal ROM shoulder, elbow, wrist without pain. Fires EPL, FPL, intrinsics. SILT ax/m/r/u nerve distributions. Radial pulse palpable, 2+.  ***LE:  ***No deformity, skin intact. No significant tenderness to palpation over thigh, knee, leg, ankle/foot. No pain with ROM hip/knee/ankle. Motor intact distally TA/GSC/EHL/FHL with ***5/5 strength. SILT sp/dp/tibial/saph/sural nerves. DP/PT pulses palpable, 2+, toes warm and well perfused.     IMAGING: ***    A/P: Delmis Parrish 73 year old female now *** wks now s/p the above procedure    - ***  - ***  - ***  - RTC x *** wks    Patient expressed understanding and is in agreement with this plan.      Patient seen and discussed with  ***.    Negrito Abbasi MD  Orthopaedic Surgery, PGY-4  Pager: [phone]

## 2023-09-01 NOTE — LETTER
9/1/2023         RE: Delmis Parrish  [address]  [place] [address]        Dear Colleague,    Thank you for referring your patient, Delmis Parrish, to the [facility] [place]. Please see a copy of my visit note below.    ORTHOPAEDIC SURGERY CLINIC FOLLOW UP    Diagnosis: Left knee osteoarthritis   Surgery: Left TKA  DOS: 3/27/23    S: Delmis Parrish is a 73 year old female now 5 months now s/p the above procedure presenting for follow up. Today patient reports she continues to make very slow but gradual improvement.  Does have pain with certain motions, most significant along her medial knee.  She is seeing physical therapy weekly.  She has 2 major concerns to discuss today.  At a recent physical therapy appointment, patient was describing her medial knee pain.  The therapist did an exam and thought she may have some laxity with valgus stress.  She also notes there have been occasions where her left knee buckled while she is walking causing her to fall.  This happened fairly regularly in the past although she has not had any knee buckling in the last week and attributes this to her strengthening with physical therapy.  She is ambulating without assistive devices.  Taking Tylenol only for pain.  Overall happy with how things are proceeding, just feels they are improving very slowly.    PE:   A&O, NAD. Pleasant, articulate.  Respirations non-labored on room air.  Nonantalgic gait  RLE: Surgical incision well-healed.  Patient has excellent range of motion from 0-130.  No mild swelling about the knee. Does appear to be a small amount of laxity with valgus stress at 30 degrees of knee flexion but no pain.  Otherwise knee stable to valgus stress at 0 degrees of knee flexion and varus stress at 0 and 30 degrees of knee flexion.  Grossly neurovascularly intact distally.    IMAGING: X-rays today demonstrate well seated left total knee arthroplasty.  Able alignment.  No  periprosthetic fractures or signs of loosening.    A/P: Delmis Parrish 73 year old female now 5 wks now s/p the above procedure.  Progressing slowly but steadily. Small amount of laxity with valgus stress could be physiologic, cannot entirely rule out a MCL strain/sprain from a fall shortly after surgery. Regardless, because the patient continues to make steady progress, will continue to observe.  Anticipate will fully recover and this will become a nonissue    -Continue physical therapy with emphasis on quadricep muscle strength.  -Rest, ice, OTC meds as needed  -Follow-up 1 year postop (March 2024) repeat x-rays or if concerns arise    Patient expressed understanding and is in agreement with this plan.      Patient seen and discussed with Dr. Espinoza.    Negrito Abbasi MD  Orthopaedic Surgery, PGY-4  Pager: [phone]        I was present with the resident during the history and exam.  I discussed the case with the resident and agree with the findings as documented in the assessment and plan.      Tee Espinoza MD

## 2023-09-01 NOTE — NURSING NOTE
Reason For Visit:   Chief Complaint   Patient presents with    RECHECK     Left knee has been giving out, pt reports a fall 4 weeks after surgery // s/p left TKA DOS 3/27/23       There were no vitals taken for this visit.    Pain Assessment  Patient Currently in Pain: Yes  0-10 Pain Scale:  (with walking)  Primary Pain Location: Knee (left)      Rafael Rodriguez, ATC

## 2023-09-05 ENCOUNTER — TELEPHONE (OUTPATIENT)
Dept: ORTHOPEDICS | Facility: CLINIC | Age: 73
End: 2023-09-05
Payer: COMMERCIAL

## 2023-09-05 DIAGNOSIS — Z96.652 HISTORY OF TOTAL KNEE ARTHROPLASTY, LEFT: Primary | ICD-10-CM

## 2023-09-05 NOTE — TELEPHONE ENCOUNTER
[facility] Call Center    Phone Message:    LEA Lorenz called from [facility], requesting a new PT order for this pt.    LEA Lorenz requesting a NEW PT ORDER, due to a changed plan of care  Working on ligament laxity  Order for new PT service to address ligament laxity  LEFT knee    If approved, please FAX order to:    FAX  [phone]    Per Kelechi, please call with any questions:  [phone]    May a detailed message be left on voicemail: YES, per Kelechi    Reason for Call: Other: New Order Needed, Please FAX     Action Taken: Message routed to:  [facility] ([facility] Team to MD    Travel Screening: Not Applicable

## 2023-09-14 ENCOUNTER — TELEPHONE (OUTPATIENT)
Dept: ORTHOPEDICS | Facility: CLINIC | Age: 73
End: 2023-09-14
Payer: COMMERCIAL

## 2023-09-14 DIAGNOSIS — Z96.652 HISTORY OF TOTAL KNEE ARTHROPLASTY, LEFT: Primary | ICD-10-CM

## 2023-09-14 RX ORDER — AMOXICILLIN 500 MG/1
TABLET, FILM COATED ORAL
Qty: 4 TABLET | Refills: 4 | Status: ON HOLD | OUTPATIENT
Start: 2023-09-14 | End: 2024-08-19

## 2023-09-14 NOTE — TELEPHONE ENCOUNTER
Hello,  I'm with ortho con.  Pt of Dr. Espinoza is requesting an antibiotic for a dental cleaning followed by a procedure with an entodontist and then will get 2 crowns.  All together she will have 3 appointments for her mouth.   Pt uses [facility] [address] in [place].  Please help with her request.  Thank you.

## 2023-09-14 NOTE — TELEPHONE ENCOUNTER
- A call was placed to the patient. Patient did not answer phone so a voicemail was left.     - Call back number to clinic was given and patient was told to call back and ask for Dr. lAexis Feliciano's nurse.     - notified patient that we have sent the antibiotics to her requested pharmacy with 4 refills. These should last her through her multiple appointments.

## 2023-09-23 ENCOUNTER — HEALTH MAINTENANCE LETTER (OUTPATIENT)
Age: 73
End: 2023-09-23

## 2024-02-20 ENCOUNTER — TELEPHONE (OUTPATIENT)
Dept: ORTHOPEDICS | Facility: CLINIC | Age: 74
End: 2024-02-20
Payer: COMMERCIAL

## 2024-02-20 NOTE — TELEPHONE ENCOUNTER
- A call was placed to the patient to schedule a follow up appointment with Dr. Espinoza in March. Patient did not answer phone so a voicemail was left.     - Call back number to clinic was given and patient was told to call back to get scheduled for their appointment.

## 2024-02-26 NOTE — TELEPHONE ENCOUNTER
RAVI left requesting a return call to schedule a follow up appointment with Dr. Espinoza.    Lisa Brock LPN

## 2024-04-10 DIAGNOSIS — Z96.652 HISTORY OF TOTAL KNEE ARTHROPLASTY, LEFT: Primary | ICD-10-CM

## 2024-04-18 ASSESSMENT — KOOS JR
TWISING OR PIVOTING ON KNEE: MODERATE
RISING FROM SITTING: MODERATE
HOW SEVERE IS YOUR KNEE STIFFNESS AFTER FIRST WAKING IN MORNING: MODERATE
STANDING UPRIGHT: MILD
BENDING TO THE FLOOR TO PICK UP OBJECT: MODERATE
GOING UP OR DOWN STAIRS: MODERATE
KOOS JR SCORING: 59.38

## 2024-04-24 ENCOUNTER — OFFICE VISIT (OUTPATIENT)
Dept: ORTHOPEDICS | Facility: CLINIC | Age: 74
End: 2024-04-24
Payer: COMMERCIAL

## 2024-04-24 ENCOUNTER — ANCILLARY PROCEDURE (OUTPATIENT)
Dept: GENERAL RADIOLOGY | Facility: CLINIC | Age: 74
End: 2024-04-24
Attending: ORTHOPAEDIC SURGERY
Payer: COMMERCIAL

## 2024-04-24 DIAGNOSIS — S76.112D RUPTURE OF LEFT QUADRICEPS TENDON, SUBSEQUENT ENCOUNTER: Primary | ICD-10-CM

## 2024-04-24 DIAGNOSIS — Z96.652 HISTORY OF TOTAL KNEE ARTHROPLASTY, LEFT: ICD-10-CM

## 2024-04-24 LAB — RADIOLOGIST FLAGS: NORMAL

## 2024-04-24 PROCEDURE — 99214 OFFICE O/P EST MOD 30 MIN: CPT | Mod: GC | Performed by: ORTHOPAEDIC SURGERY

## 2024-04-24 PROCEDURE — 73560 X-RAY EXAM OF KNEE 1 OR 2: CPT | Mod: LT | Performed by: RADIOLOGY

## 2024-04-24 ASSESSMENT — PATIENT HEALTH QUESTIONNAIRE - PHQ9: SUM OF ALL RESPONSES TO PHQ QUESTIONS 1-9: 4

## 2024-04-24 NOTE — PROGRESS NOTES
Postoperative follow-up clinic visit    Delmis Parrish is a 73-year-old female 1 year out from her left total knee arthroplasty presenting for routine follow-up.  The patient had 2 falls following her surgery.  There was initially concern for an MCL injury and she continued to do physical therapy.  The patient states she has continued to do physical therapy over the last year.  Despite this, the knee continues to buckle and her patella will sublux laterally. This has causes her to fall twice in the last 10 days. She endorses discomfort with these episodes.  Denies any swelling, warmth or redness over the knee.     EXAM:  Gait: Antalgic stifflegged gait.  Assistive devices: None  LLE: Incision healed.  No erythema or warmth. Palpable defect over medial quad.  Able to straight leg raise.  Quad muscles will give out with gentle active flexion of the knee with lateral subluxation of the patella.  No significant tenderness to palpation throughout the knee.  No effusion.  +Quad/TA/GSC/EHL/FHL; SILT DP/Sp/Reuben/Saph/Tib nerve distributions Palpable dorsalis pedis pulse    IMAGING:  Knee x-rays were obtained in clinic today.  These demonstrate stable hardware without any fractures.  There is lateral subluxation of the patella.    ASSESSMENT AND PLAN:  73-year-old female 1 year out from a left total knee arthroplasty with a quadriceps tendon tear.    Imaging was reviewed with the patient.  We discussed her diagnosis in detail.  We discussed that she likely obtained a quadriceps tear at the location of her capsular repair during her falls after surgery.  We discussed our recommendation for surgery with quadriceps repair in order to optimize her function.  She would like to proceed with this.  Case request was placed today.  She will need a preoperative history and physical.  We discussed the plan for physical therapy in approximately 4 weeks and a knee immobilizer with the knee in extension for healing.    The patient was seen  discussed with Dr. Espinoza who agrees with the above assessment and plan.     Chelita Chung MD, PGY4  Orthopedic Surgery

## 2024-04-24 NOTE — PROGRESS NOTES
At gym. Still doing PT. Knee gives out while walking.   Two falls last 10 days   No significant pain

## 2024-04-24 NOTE — NURSING NOTE
Reason For Visit:   Chief Complaint   Patient presents with    RECHECK     Follow up left TKA DOS 3/27/23        73 year old  1950    Primary MD: Monie Dockery      There were no vitals taken for this visit.    Pain Assessment  Patient Currently in Pain: Yes  0-10 Pain Scale: 5  Primary Pain Location: Knee (left)      Rafael Rodriguez, ATC

## 2024-04-24 NOTE — NURSING NOTE
Pre-Op Teaching was done in person at the clinic.    Teaching Flowsheet   Relevant Diagnosis: Pre-Op Teaching  Teaching Topic:      Person(s) involved in teaching:   Patient     Motivation Level:  Asks Questions: Yes  Eager to Learn: Yes  Cooperative: Yes  Receptive (willing/able to accept information): Yes  Any cultural factors/Mandaen beliefs that may influence understanding or compliance? No     Patient demonstrates understanding of the following:  Reason for the appointment, diagnosis and treatment plan: Yes  Knowledge of proper use of medications and conditions for which they are ordered (with special attention to potential side effects or drug interactions): Yes  Which situations necessitate calling provider and whom to contact: Yes- discussed the stoplight tool to help assist with this.      Teaching Concerns Addressed:      Proper use of surgical scrub explain: Yes    Nutritional needs and diet plan: Yes  Pain management techniques: Yes  Wound Care: Yes  How and/when to access community resources: Yes     Instructional Materials Used/Given:  2 bottle of chlorhexidine and a surgery packet given to patient in clinic.      - Important contact info/ phone numbers: emphasizing clinic number and after hours number  - Map/ location of surgery  - Medications to avoid  - Showering instructions  - Stop light tool    Additionally the following was discussed with patient:  - Family Member will be driving the patient to surgery and staying with them for 24 hours.       -Next step: Schedule a surgery date and schedule a Pre-Op appointment with PCP     Time spent with patient: 15 minutes.

## 2024-04-24 NOTE — LETTER
4/24/2024         RE: Delmis Parrish  [address]  [place] [address]        Dear Colleague,    Thank you for referring your patient, Delmis Parrish, to the [facility] [place]. Please see a copy of my visit note below.    Postoperative follow-up clinic visit    Delmis Parrish is a 73-year-old female 1 year out from her left total knee arthroplasty presenting for routine follow-up.  The patient had 2 falls following her surgery.  There was initially concern for an MCL injury and she continued to do physical therapy.  The patient states she has continued to do physical therapy over the last year.  Despite this, the knee continues to buckle and her patella will sublux laterally. This has causes her to fall twice in the last 10 days. She endorses discomfort with these episodes.  Denies any swelling, warmth or redness over the knee.     EXAM:  Gait: Antalgic stifflegged gait.  Assistive devices: None  LLE: Incision healed.  No erythema or warmth. Palpable defect over medial quad.  Able to straight leg raise.  Quad muscles will give out with gentle active flexion of the knee with lateral subluxation of the patella.  No significant tenderness to palpation throughout the knee.  No effusion.  +Quad/TA/GSC/EHL/FHL; SILT DP/Sp/Reuben/Saph/Tib nerve distributions Palpable dorsalis pedis pulse    IMAGING:  Knee x-rays were obtained in clinic today.  These demonstrate stable hardware without any fractures.  There is lateral subluxation of the patella.    ASSESSMENT AND PLAN:  73-year-old female 1 year out from a left total knee arthroplasty with a quadriceps tendon tear.    Imaging was reviewed with the patient.  We discussed her diagnosis in detail.  We discussed that she likely obtained a quadriceps tear at the location of her capsular repair during her falls after surgery.  We discussed our recommendation for surgery with quadriceps repair in order to optimize her function.  She  would like to proceed with this.  Case request was placed today.  She will need a preoperative history and physical.  We discussed the plan for physical therapy in approximately 4 weeks and a knee immobilizer with the knee in extension for healing.    The patient was seen discussed with Dr. Espinoza who agrees with the above assessment and plan.     Chelita Chung MD, PGY4  Orthopedic Surgery        Tee Espinoza MD

## 2024-04-29 ENCOUNTER — TELEPHONE (OUTPATIENT)
Dept: ORTHOPEDICS | Facility: CLINIC | Age: 74
End: 2024-04-29
Payer: COMMERCIAL

## 2024-04-29 NOTE — TELEPHONE ENCOUNTER
Phoned patient to get her scheduled for surgery with Dr. Espinoza    Call went to [facility].  Provided call back number in [facility]:   [phone] & [phone] for care team.   Will try again.

## 2024-04-30 NOTE — TELEPHONE ENCOUNTER
Patient is scheduled for surgery with Dr. Espinoza     Spoke with: Delmis    Date of Surgery: 5/13/24    Location: [facility] OR    Informed patient they will need an adult  Yes    Pre op with Provider PCP; pt has it schedule with Dr. Turk    Additional imaging/appointments: POP Made    Surgery packet: Received     Additional comments: N/A        Suzan Klein on 4/30/2024 at 8:55 AM

## 2024-05-13 ENCOUNTER — ANESTHESIA EVENT (OUTPATIENT)
Dept: SURGERY | Facility: CLINIC | Age: 74
End: 2024-05-13
Payer: COMMERCIAL

## 2024-05-13 ENCOUNTER — HOSPITAL ENCOUNTER (OUTPATIENT)
Facility: CLINIC | Age: 74
Discharge: HOME | End: 2024-05-13
Attending: ORTHOPAEDIC SURGERY | Admitting: ORTHOPAEDIC SURGERY
Payer: COMMERCIAL

## 2024-05-13 ENCOUNTER — ANESTHESIA (OUTPATIENT)
Dept: SURGERY | Facility: CLINIC | Age: 74
End: 2024-05-13
Payer: COMMERCIAL

## 2024-05-13 ENCOUNTER — APPOINTMENT (OUTPATIENT)
Dept: GENERAL RADIOLOGY | Facility: CLINIC | Age: 74
End: 2024-05-13
Attending: PHYSICIAN ASSISTANT
Payer: COMMERCIAL

## 2024-05-13 VITALS
DIASTOLIC BLOOD PRESSURE: 91 MMHG | RESPIRATION RATE: 16 BRPM | SYSTOLIC BLOOD PRESSURE: 147 MMHG | WEIGHT: 183.86 LBS | TEMPERATURE: 98.2 F | HEIGHT: 68 IN | BODY MASS INDEX: 27.87 KG/M2 | OXYGEN SATURATION: 95 % | HEART RATE: 63 BPM

## 2024-05-13 DIAGNOSIS — S76.112A QUADRICEPS TENDON RUPTURE, LEFT, INITIAL ENCOUNTER: Primary | ICD-10-CM

## 2024-05-13 DIAGNOSIS — M17.12 PRIMARY OSTEOARTHRITIS OF LEFT KNEE: ICD-10-CM

## 2024-05-13 LAB — GLUCOSE BLDC GLUCOMTR-MCNC: 99 MG/DL (ref 70–99)

## 2024-05-13 PROCEDURE — 370N000017 HC ANESTHESIA TECHNICAL FEE, PER MIN: Performed by: ORTHOPAEDIC SURGERY

## 2024-05-13 PROCEDURE — 250N000011 HC RX IP 250 OP 636: Performed by: PHYSICIAN ASSISTANT

## 2024-05-13 PROCEDURE — 999N000065 XR KNEE PORT LEFT 1/2 VIEWS: Mod: LT

## 2024-05-13 PROCEDURE — 999N000141 HC STATISTIC PRE-PROCEDURE NURSING ASSESSMENT: Performed by: ORTHOPAEDIC SURGERY

## 2024-05-13 PROCEDURE — 99100 ANES PT EXTEME AGE<1 YR&>70: CPT | Performed by: STUDENT IN AN ORGANIZED HEALTH CARE EDUCATION/TRAINING PROGRAM

## 2024-05-13 PROCEDURE — 250N000011 HC RX IP 250 OP 636: Performed by: ANESTHESIOLOGY

## 2024-05-13 PROCEDURE — 250N000013 HC RX MED GY IP 250 OP 250 PS 637: Performed by: PHYSICIAN ASSISTANT

## 2024-05-13 PROCEDURE — 258N000003 HC RX IP 258 OP 636: Performed by: NURSE ANESTHETIST, CERTIFIED REGISTERED

## 2024-05-13 PROCEDURE — 250N000009 HC RX 250: Performed by: ANESTHESIOLOGY

## 2024-05-13 PROCEDURE — 27385 REPAIR OF THIGH MUSCLE: CPT | Performed by: STUDENT IN AN ORGANIZED HEALTH CARE EDUCATION/TRAINING PROGRAM

## 2024-05-13 PROCEDURE — 710N000012 HC RECOVERY PHASE 2, PER MINUTE: Performed by: ORTHOPAEDIC SURGERY

## 2024-05-13 PROCEDURE — 710N000010 HC RECOVERY PHASE 1, LEVEL 2, PER MIN: Performed by: ORTHOPAEDIC SURGERY

## 2024-05-13 PROCEDURE — 250N000011 HC RX IP 250 OP 636: Performed by: ORTHOPAEDIC SURGERY

## 2024-05-13 PROCEDURE — 272N000001 HC OR GENERAL SUPPLY STERILE: Performed by: ORTHOPAEDIC SURGERY

## 2024-05-13 PROCEDURE — 99100 ANES PT EXTEME AGE<1 YR&>70: CPT | Performed by: NURSE ANESTHETIST, CERTIFIED REGISTERED

## 2024-05-13 PROCEDURE — 250N000025 HC SEVOFLURANE, PER MIN: Performed by: ORTHOPAEDIC SURGERY

## 2024-05-13 PROCEDURE — 250N000009 HC RX 250: Performed by: NURSE ANESTHETIST, CERTIFIED REGISTERED

## 2024-05-13 PROCEDURE — 64447 NJX AA&/STRD FEMORAL NRV IMG: CPT | Mod: 59 | Performed by: ANESTHESIOLOGY

## 2024-05-13 PROCEDURE — 360N000076 HC SURGERY LEVEL 3, PER MIN: Performed by: ORTHOPAEDIC SURGERY

## 2024-05-13 PROCEDURE — 250N000011 HC RX IP 250 OP 636: Performed by: NURSE ANESTHETIST, CERTIFIED REGISTERED

## 2024-05-13 PROCEDURE — 82962 GLUCOSE BLOOD TEST: CPT

## 2024-05-13 PROCEDURE — 27385 REPAIR OF THIGH MUSCLE: CPT | Performed by: NURSE ANESTHETIST, CERTIFIED REGISTERED

## 2024-05-13 RX ORDER — OXYCODONE HYDROCHLORIDE 5 MG/1
5 TABLET ORAL
Status: COMPLETED | OUTPATIENT
Start: 2024-05-13 | End: 2024-05-13

## 2024-05-13 RX ORDER — ONDANSETRON 2 MG/ML
4 INJECTION INTRAMUSCULAR; INTRAVENOUS EVERY 30 MIN PRN
Status: DISCONTINUED | OUTPATIENT
Start: 2024-05-13 | End: 2024-05-13 | Stop reason: HOSPADM

## 2024-05-13 RX ORDER — OXYCODONE HYDROCHLORIDE 5 MG/1
5 TABLET ORAL
Status: DISCONTINUED | OUTPATIENT
Start: 2024-05-13 | End: 2024-05-13 | Stop reason: HOSPADM

## 2024-05-13 RX ORDER — ASPIRIN 81 MG/1
81 TABLET ORAL 2 TIMES DAILY
Qty: 60 TABLET | Refills: 0 | Status: SHIPPED | OUTPATIENT
Start: 2024-05-13

## 2024-05-13 RX ORDER — OXYCODONE HYDROCHLORIDE 10 MG/1
10 TABLET ORAL
Status: DISCONTINUED | OUTPATIENT
Start: 2024-05-13 | End: 2024-05-13 | Stop reason: HOSPADM

## 2024-05-13 RX ORDER — SODIUM CHLORIDE, SODIUM LACTATE, POTASSIUM CHLORIDE, CALCIUM CHLORIDE 600; 310; 30; 20 MG/100ML; MG/100ML; MG/100ML; MG/100ML
INJECTION, SOLUTION INTRAVENOUS CONTINUOUS PRN
Status: DISCONTINUED | OUTPATIENT
Start: 2024-05-13 | End: 2024-05-13

## 2024-05-13 RX ORDER — HYDROMORPHONE HYDROCHLORIDE 1 MG/ML
0.2 INJECTION, SOLUTION INTRAMUSCULAR; INTRAVENOUS; SUBCUTANEOUS EVERY 5 MIN PRN
Status: DISCONTINUED | OUTPATIENT
Start: 2024-05-13 | End: 2024-05-13 | Stop reason: HOSPADM

## 2024-05-13 RX ORDER — HYDROMORPHONE HYDROCHLORIDE 1 MG/ML
0.4 INJECTION, SOLUTION INTRAMUSCULAR; INTRAVENOUS; SUBCUTANEOUS EVERY 5 MIN PRN
Status: DISCONTINUED | OUTPATIENT
Start: 2024-05-13 | End: 2024-05-13 | Stop reason: HOSPADM

## 2024-05-13 RX ORDER — DEXAMETHASONE SODIUM PHOSPHATE 10 MG/ML
INJECTION, SOLUTION INTRAMUSCULAR; INTRAVENOUS
Status: COMPLETED | OUTPATIENT
Start: 2024-05-13 | End: 2024-05-13

## 2024-05-13 RX ORDER — NALOXONE HYDROCHLORIDE 0.4 MG/ML
0.1 INJECTION, SOLUTION INTRAMUSCULAR; INTRAVENOUS; SUBCUTANEOUS
Status: DISCONTINUED | OUTPATIENT
Start: 2024-05-13 | End: 2024-05-13 | Stop reason: HOSPADM

## 2024-05-13 RX ORDER — DEXMEDETOMIDINE HYDROCHLORIDE 4 UG/ML
INJECTION, SOLUTION INTRAVENOUS
Status: COMPLETED | OUTPATIENT
Start: 2024-05-13 | End: 2024-05-13

## 2024-05-13 RX ORDER — FENTANYL CITRATE 50 UG/ML
50 INJECTION, SOLUTION INTRAMUSCULAR; INTRAVENOUS EVERY 5 MIN PRN
Status: DISCONTINUED | OUTPATIENT
Start: 2024-05-13 | End: 2024-05-13 | Stop reason: HOSPADM

## 2024-05-13 RX ORDER — ONDANSETRON 2 MG/ML
INJECTION INTRAMUSCULAR; INTRAVENOUS PRN
Status: DISCONTINUED | OUTPATIENT
Start: 2024-05-13 | End: 2024-05-13

## 2024-05-13 RX ORDER — ONDANSETRON 4 MG/1
4 TABLET, ORALLY DISINTEGRATING ORAL EVERY 30 MIN PRN
Status: DISCONTINUED | OUTPATIENT
Start: 2024-05-13 | End: 2024-05-13 | Stop reason: HOSPADM

## 2024-05-13 RX ORDER — PROPOFOL 10 MG/ML
INJECTION, EMULSION INTRAVENOUS PRN
Status: DISCONTINUED | OUTPATIENT
Start: 2024-05-13 | End: 2024-05-13

## 2024-05-13 RX ORDER — FENTANYL CITRATE 50 UG/ML
25 INJECTION, SOLUTION INTRAMUSCULAR; INTRAVENOUS EVERY 5 MIN PRN
Status: DISCONTINUED | OUTPATIENT
Start: 2024-05-13 | End: 2024-05-13 | Stop reason: HOSPADM

## 2024-05-13 RX ORDER — CEFAZOLIN SODIUM/WATER 2 G/20 ML
2 SYRINGE (ML) INTRAVENOUS
Status: COMPLETED | OUTPATIENT
Start: 2024-05-13 | End: 2024-05-13

## 2024-05-13 RX ORDER — SODIUM CHLORIDE, SODIUM LACTATE, POTASSIUM CHLORIDE, CALCIUM CHLORIDE 600; 310; 30; 20 MG/100ML; MG/100ML; MG/100ML; MG/100ML
INJECTION, SOLUTION INTRAVENOUS CONTINUOUS
Status: DISCONTINUED | OUTPATIENT
Start: 2024-05-13 | End: 2024-05-13 | Stop reason: HOSPADM

## 2024-05-13 RX ORDER — ACETAMINOPHEN 325 MG/1
650 TABLET ORAL
Status: DISCONTINUED | OUTPATIENT
Start: 2024-05-13 | End: 2024-05-13 | Stop reason: HOSPADM

## 2024-05-13 RX ORDER — BUPIVACAINE HYDROCHLORIDE 2.5 MG/ML
INJECTION, SOLUTION INFILTRATION; PERINEURAL PRN
Status: DISCONTINUED | OUTPATIENT
Start: 2024-05-13 | End: 2024-05-13 | Stop reason: HOSPADM

## 2024-05-13 RX ORDER — NALOXONE HYDROCHLORIDE 0.4 MG/ML
0.4 INJECTION, SOLUTION INTRAMUSCULAR; INTRAVENOUS; SUBCUTANEOUS
Status: DISCONTINUED | OUTPATIENT
Start: 2024-05-13 | End: 2024-05-13 | Stop reason: HOSPADM

## 2024-05-13 RX ORDER — FENTANYL CITRATE 50 UG/ML
25-50 INJECTION, SOLUTION INTRAMUSCULAR; INTRAVENOUS
Status: DISCONTINUED | OUTPATIENT
Start: 2024-05-13 | End: 2024-05-13 | Stop reason: HOSPADM

## 2024-05-13 RX ORDER — DEXAMETHASONE SODIUM PHOSPHATE 4 MG/ML
4 INJECTION, SOLUTION INTRA-ARTICULAR; INTRALESIONAL; INTRAMUSCULAR; INTRAVENOUS; SOFT TISSUE
Status: DISCONTINUED | OUTPATIENT
Start: 2024-05-13 | End: 2024-05-13 | Stop reason: HOSPADM

## 2024-05-13 RX ORDER — OXYCODONE HYDROCHLORIDE 5 MG/1
5 TABLET ORAL EVERY 4 HOURS PRN
Qty: 20 TABLET | Refills: 0 | Status: ON HOLD | OUTPATIENT
Start: 2024-05-13 | End: 2024-08-19

## 2024-05-13 RX ORDER — AMOXICILLIN 250 MG
1 CAPSULE ORAL 2 TIMES DAILY
Qty: 30 TABLET | Refills: 0 | Status: SHIPPED | OUTPATIENT
Start: 2024-05-13

## 2024-05-13 RX ORDER — FENTANYL CITRATE 50 UG/ML
INJECTION, SOLUTION INTRAMUSCULAR; INTRAVENOUS PRN
Status: DISCONTINUED | OUTPATIENT
Start: 2024-05-13 | End: 2024-05-13

## 2024-05-13 RX ORDER — FLUMAZENIL 0.1 MG/ML
0.2 INJECTION, SOLUTION INTRAVENOUS
Status: DISCONTINUED | OUTPATIENT
Start: 2024-05-13 | End: 2024-05-13 | Stop reason: HOSPADM

## 2024-05-13 RX ORDER — NALOXONE HYDROCHLORIDE 0.4 MG/ML
0.2 INJECTION, SOLUTION INTRAMUSCULAR; INTRAVENOUS; SUBCUTANEOUS
Status: DISCONTINUED | OUTPATIENT
Start: 2024-05-13 | End: 2024-05-13 | Stop reason: HOSPADM

## 2024-05-13 RX ORDER — BUPIVACAINE HYDROCHLORIDE 2.5 MG/ML
INJECTION, SOLUTION EPIDURAL; INFILTRATION; INTRACAUDAL
Status: COMPLETED | OUTPATIENT
Start: 2024-05-13 | End: 2024-05-13

## 2024-05-13 RX ORDER — CEFAZOLIN SODIUM/WATER 2 G/20 ML
2 SYRINGE (ML) INTRAVENOUS SEE ADMIN INSTRUCTIONS
Status: DISCONTINUED | OUTPATIENT
Start: 2024-05-13 | End: 2024-05-13 | Stop reason: HOSPADM

## 2024-05-13 RX ORDER — GLYCOPYRROLATE 0.2 MG/ML
INJECTION, SOLUTION INTRAMUSCULAR; INTRAVENOUS PRN
Status: DISCONTINUED | OUTPATIENT
Start: 2024-05-13 | End: 2024-05-13

## 2024-05-13 RX ORDER — PROPOFOL 10 MG/ML
INJECTION, EMULSION INTRAVENOUS CONTINUOUS PRN
Status: DISCONTINUED | OUTPATIENT
Start: 2024-05-13 | End: 2024-05-13

## 2024-05-13 RX ORDER — LIDOCAINE HYDROCHLORIDE 20 MG/ML
INJECTION, SOLUTION INFILTRATION; PERINEURAL PRN
Status: DISCONTINUED | OUTPATIENT
Start: 2024-05-13 | End: 2024-05-13

## 2024-05-13 RX ADMIN — PROPOFOL 150 MG: 10 INJECTION, EMULSION INTRAVENOUS at 13:06

## 2024-05-13 RX ADMIN — FENTANYL CITRATE 50 MCG: 50 INJECTION INTRAMUSCULAR; INTRAVENOUS at 12:09

## 2024-05-13 RX ADMIN — SUGAMMADEX 200 MG: 100 INJECTION, SOLUTION INTRAVENOUS at 14:40

## 2024-05-13 RX ADMIN — BUPIVACAINE HYDROCHLORIDE 20 ML: 2.5 INJECTION, SOLUTION EPIDURAL; INFILTRATION; INTRACAUDAL at 12:00

## 2024-05-13 RX ADMIN — PROPOFOL 20 MG: 10 INJECTION, EMULSION INTRAVENOUS at 14:25

## 2024-05-13 RX ADMIN — Medication 2 G: at 13:01

## 2024-05-13 RX ADMIN — PHENYLEPHRINE HYDROCHLORIDE 100 MCG: 10 INJECTION INTRAVENOUS at 13:39

## 2024-05-13 RX ADMIN — GLYCOPYRROLATE 0.2 MG: 0.2 INJECTION, SOLUTION INTRAMUSCULAR; INTRAVENOUS at 13:29

## 2024-05-13 RX ADMIN — MIDAZOLAM HYDROCHLORIDE 2 MG: 1 INJECTION, SOLUTION INTRAMUSCULAR; INTRAVENOUS at 12:11

## 2024-05-13 RX ADMIN — PHENYLEPHRINE HYDROCHLORIDE 0.25 MCG/KG/MIN: 10 INJECTION INTRAVENOUS at 13:54

## 2024-05-13 RX ADMIN — PHENYLEPHRINE HYDROCHLORIDE 100 MCG: 10 INJECTION INTRAVENOUS at 13:50

## 2024-05-13 RX ADMIN — SODIUM CHLORIDE, SODIUM LACTATE, POTASSIUM CHLORIDE, CALCIUM CHLORIDE: 600; 310; 30; 20 INJECTION, SOLUTION INTRAVENOUS at 13:20

## 2024-05-13 RX ADMIN — Medication 10 MG: at 13:50

## 2024-05-13 RX ADMIN — ACETAMINOPHEN 650 MG: 325 TABLET, FILM COATED ORAL at 15:44

## 2024-05-13 RX ADMIN — PHENYLEPHRINE HYDROCHLORIDE 100 MCG: 10 INJECTION INTRAVENOUS at 13:22

## 2024-05-13 RX ADMIN — Medication 50 MG: at 13:08

## 2024-05-13 RX ADMIN — HYDROMORPHONE HYDROCHLORIDE 0.2 MG: 1 INJECTION, SOLUTION INTRAMUSCULAR; INTRAVENOUS; SUBCUTANEOUS at 14:58

## 2024-05-13 RX ADMIN — OXYCODONE 5 MG: 5 TABLET ORAL at 15:44

## 2024-05-13 RX ADMIN — PHENYLEPHRINE HYDROCHLORIDE 100 MCG: 10 INJECTION INTRAVENOUS at 13:28

## 2024-05-13 RX ADMIN — PHENYLEPHRINE HYDROCHLORIDE 100 MCG: 10 INJECTION INTRAVENOUS at 13:42

## 2024-05-13 RX ADMIN — SODIUM CHLORIDE, POTASSIUM CHLORIDE, SODIUM LACTATE AND CALCIUM CHLORIDE: 600; 310; 30; 20 INJECTION, SOLUTION INTRAVENOUS at 12:46

## 2024-05-13 RX ADMIN — DEXMEDETOMIDINE 20 MCG: 100 INJECTION, SOLUTION, CONCENTRATE INTRAVENOUS at 12:00

## 2024-05-13 RX ADMIN — FENTANYL CITRATE 100 MCG: 50 INJECTION INTRAMUSCULAR; INTRAVENOUS at 13:04

## 2024-05-13 RX ADMIN — LIDOCAINE HYDROCHLORIDE 100 MG: 20 INJECTION, SOLUTION INFILTRATION; PERINEURAL at 13:04

## 2024-05-13 RX ADMIN — ONDANSETRON 4 MG: 2 INJECTION INTRAMUSCULAR; INTRAVENOUS at 14:12

## 2024-05-13 RX ADMIN — PROPOFOL 20 MCG/KG/MIN: 10 INJECTION, EMULSION INTRAVENOUS at 13:22

## 2024-05-13 RX ADMIN — HYDROMORPHONE HYDROCHLORIDE 0.3 MG: 1 INJECTION, SOLUTION INTRAMUSCULAR; INTRAVENOUS; SUBCUTANEOUS at 14:26

## 2024-05-13 RX ADMIN — PROPOFOL 20 MG: 10 INJECTION, EMULSION INTRAVENOUS at 14:39

## 2024-05-13 RX ADMIN — DEXAMETHASONE SODIUM PHOSPHATE 2 MG: 10 INJECTION, SOLUTION INTRAMUSCULAR; INTRAVENOUS at 12:00

## 2024-05-13 RX ADMIN — PHENYLEPHRINE HYDROCHLORIDE 100 MCG: 10 INJECTION INTRAVENOUS at 13:06

## 2024-05-13 ASSESSMENT — ACTIVITIES OF DAILY LIVING (ADL)
ADLS_ACUITY_SCORE: 22
ADLS_ACUITY_SCORE: 23
ADLS_ACUITY_SCORE: 22

## 2024-05-13 NOTE — ANESTHESIA PROCEDURE NOTES
Adductor canal Procedure Note    Pre-Procedure   Staff -        Anesthesiologist:  Tommy Ayala MD       Performed By: anesthesiologist       Location: pre-op       Pre-Anesthestic Checklist: patient identified, IV checked, site marked, risks and benefits discussed, informed consent, monitors and equipment checked, pre-op evaluation, at physician/surgeon's request and post-op pain management  Timeout:       Correct Patient: Yes        Correct Procedure: Yes        Correct Site: Yes        Correct Position: Yes        Correct Laterality: Yes        Site Marked: Yes  Procedure Documentation  Procedure: Adductor canal       Diagnosis: POST OPERATIVE PAIN       Laterality: left       Patient Position: supine       Patient Prep/Sterile Barriers: sterile gloves, mask       Skin prep: Chloraprep       Needle Type: short bevel       Needle Gauge: 21.        Needle Length (millimeters): 110        Ultrasound guided       1. Ultrasound was used to identify targeted nerve, plexus, vascular marker, or fascial plane and place a needle adjacent to it in real-time.       2. Ultrasound was used to visualize the spread of anesthetic in close proximity to the above referenced structure.       3. A permanent image is entered into the patient's record.    Assessment/Narrative         The placement was negative for: blood aspirated, painful injection and site bleeding       Paresthesias: No.       Bolus given via needle..        Secured via.        Insertion/Infusion Method: Single Shot       Complications: none       Injection made incrementally with aspirations every 5 mL.    Medication(s) Administered   Bupivacaine 0.25% PF (Infiltration) - Infiltration   20 mL - 5/13/2024 12:00:00 PM  Dexmedetomidine 4 mcg/mL (Perineural) - Perineural   20 mcg - 5/13/2024 12:00:00 PM  Dexamethasone 10 mg/mL PF (Perineural) - Perineural   2 mg - 5/13/2024 12:00:00 PM    FOR [facility] ([facility]) ONLY:   Pain Team Contact information:  "please page the Pain Team Via [facility]. Search \"Pain\". During daytime hours, please page the attending first. At night please page the resident first.      "

## 2024-05-13 NOTE — ANESTHESIA PROCEDURE NOTES
Airway       Patient location during procedure: OR       Procedure Start/Stop Times: 5/13/2024 1:09 PM  Staff -        CRNA: Johanna Yañez APRN CRNA       Performed By: CRNA  Consent for Airway        Urgency: elective  Indications and Patient Condition       Indications for airway management: damien-procedural       Induction type:intravenous       Mask difficulty assessment: 2 - vent by mask + OA or adjuvant +/- NMBA    Final Airway Details       Final airway type: endotracheal airway       Successful airway: ETT - single and Oral  Endotracheal Airway Details        ETT size (mm): 7.0       Cuffed: yes       Successful intubation technique: direct laryngoscopy       DL Blade Type: MAC 3       Grade View of Cords: 1       Adjucts: stylet       Position: Right       Measured from: gums/teeth       Secured at (cm): 21       Bite block used: None    Post intubation assessment        Placement verified by: capnometry, equal breath sounds and chest rise        Number of attempts at approach: 1       Secured with: tape       Ease of procedure: easy       Dentition: Intact and Unchanged (lips dry and flaky)    Medication(s) Administered   Medication Administration Time: 5/13/2024 1:09 PM

## 2024-05-13 NOTE — OP NOTE
DATE OF SURGERY: 5/13/2024    PREOPERATIVE DIAGNOSIS: Quadricep tendon tear left knee    POSTOPERATIVE DIAGNOSIS: Quadricep tendon tear left knee    PROCEDURE: Left knee quadricep tendon repair    SURGEON: Tee Espinoza MD     ASSISTANT: Cheryl Ayala PA-C as an assistant was required to help retract and close the wound, and resident help was not available.    PATIENT HISTORY: This patient is just over a year out from a left total knee arthroplasty.  She has noticed some subluxation of the patella and some weakness of the knee.  We did see that radiographically her patella has become subluxed and she seems to have a defect between the vastus medialis of the patella.  It seems that her quadricep tendon repair has failed.  The patient understands the risk of repair including infection bleeding pain numbness tingling stiffness weakness limp deep venous thrombosis and pulmonary embolism.    DESCRIPTION OF PROCEDURE: The patient underwent successful induction of anesthesia.  The left leg was washed and sterilely prepped and draped.  I opened up the proximal three quarters of the incision sharply divided the subcutaneous tissue with cautery down to the patella and then worked my way medially and began to explore the defect between the patella and the vastus medialis.  I first identified the lateral aspect of the tendon that was still attached to the patella.  I then excised an area of thinned scar between this and the lateral portion of the quadricep tendon and the tendon immediately adjacent to the vastus medialis.  I released scar on the dorsal and deep aspect of the vastus medialis and remove scar tissue down to what seem to be the tendinous portion.  I also freshened up the edge of the lateral tendon and then repaired these 2 in the size of the tendon together with #1 Vicryl.  I also used a #5 Tycron right on the top of the patella to bring together the last bit of tendon from the medial to lateral.  As  part of a closure we copiously irrigated.  We used 0 Vicryl in the subcutaneous tissue on top of the tendon repair Monocryl and the skin Steri-Strips and sterile dry dressing.  The patient was placed in knee immobilizer and will keep the knee straight for about 2 weeks and then we will allow her to begin advancing motion.  I could get her to 90 degrees passively without overly stressing the repair.  We will allow her to weight-bear as tolerated.  Following surgery the patient was extubated and taken to the recovery room in stable condition.  The estimated blood loss is 10 mL or less.  I was present for all critical portions of the procedure.    Tee Espinoza MD

## 2024-05-13 NOTE — ANESTHESIA PREPROCEDURE EVALUATION
Anesthesia Pre-Procedure Evaluation    Patient: Delmis Parrish   MRN: [id number] : 1950        Procedure : Procedure(s):  Repair left quadricep tendon          Past Medical History:   Diagnosis Date    Gastroesophageal reflux disease     Generalized anxiety disorder     Insomnia     Insomnia     LVH (left ventricular hypertrophy)     Major depressive disorder, recurrent episode, mild (H24)     Renovascular hypertension       Past Surgical History:   Procedure Laterality Date    ARTHROPLASTY KNEE Left 3/27/2023    Procedure: ARTHROPLASTY, KNEE, TOTAL LEFT;  Surgeon: Tee Espinoza MD;  [facility]    BIOPSY BONE LOWER EXTREMITY Left 2022    Procedure: Biopsy Left Tibia;  Surgeon: Tee Espinoza MD;  [facility]    CHOLECYSTECTOMY      CURETTAGE AND CEMENTATION LOWER EXTREMITY, COMBINED Left 2022    Procedure: With Curettage and Allograft Placement;  Surgeon: Tee Espinoza MD;  [facility]    EXC TUMR SOFT TISSUE BACK/FLANK       FLUOROGUIDE FOR SPINE INJECT   2008    HYSTERECTOMY        Allergies   Allergen Reactions    Adhesive Tape      Athletic tape    Fd&C Yellow #5 (Tartrazine) Nausea and Vomiting     aka darvon      Paroxetine Nausea and Vomiting     aka paxil      Sertraline Nausea and Vomiting     aka zoloft        Social History     Tobacco Use    Smoking status: Never    Smokeless tobacco: Never   Substance Use Topics    Alcohol use: Not Currently      Wt Readings from Last 1 Encounters:   24 83.4 kg (183 lb 13.8 oz)        Anesthesia Evaluation   Pt has had prior anesthetic.     No history of anesthetic complications       ROS/MED HX  ENT/Pulmonary: Comment: Bronchiectasis      Neurologic: Comment: Spinal cord stimulator    (+)    peripheral neuropathy,                            Cardiovascular: Comment: LVH, Mitral Valve Disease    (+)  hypertension- -   -  - -                                 Previous cardiac testing   Echo: Date:  2022 Results:    1. Dobutamine stress was performed.     2. Functional capacity was not assessed.     3. The patient experienced shortness of breath, fatigue and with infusion   during the stress test. Symptoms resolved in recovery.     4. Negative stress ECG for ST segment depression.     5. Normal hemodynamic response to dobutamine. Isolated PVC's.     6. Normal left ventricular systolic function with no regional wall motion   abnormalities noted at rest.   LVEF 60%.     7. Post stress, no wall motion abnormalities seen.   LV systolic size   decreases and systolic function increases appropriately. However, peak HR   was   not achieved even with atropine in addition to maximum dobutamine (peak   images   are obtained are markedly reduced heart rates which limits this study in   its   detection of ischemia).     Stress Test:  Date: Results:    ECG Reviewed:  Date: Results:    Cath:  Date: Results:      METS/Exercise Tolerance:     Hematologic:  - neg hematologic  ROS     Musculoskeletal: Comment: Rupture of left quadriceps tendon      GI/Hepatic:     (+) GERD, Asymptomatic on medication,                  Renal/Genitourinary:  - neg Renal ROS     Endo:  - neg endo ROS     Psychiatric/Substance Use:     (+) psychiatric history anxiety and depression alcohol abuse      Infectious Disease:  - neg infectious disease ROS     Malignancy:  - neg malignancy ROS     Other:            Physical Exam    Airway        Mallampati: II   TM distance: > 3 FB   Neck ROM: full   Mouth opening: > 3 cm    Respiratory Devices and Support         Dental       (+) Completely normal teeth      Cardiovascular   cardiovascular exam normal          Pulmonary   pulmonary exam normal                OUTSIDE LABS:  CBC:   Lab Results   Component Value Date    WBC 12.1 (H) 03/28/2023    WBC 10.6 03/27/2023    HGB 10.9 (L) 03/28/2023    HGB 13.1 03/27/2023    HCT 33.8 (L) 03/28/2023    HCT 40.4 03/27/2023     03/28/2023     03/27/2023  "    BMP:   Lab Results   Component Value Date     (L) 03/28/2023     03/27/2023    POTASSIUM 4.3 03/28/2023    POTASSIUM 4.0 03/27/2023    CHLORIDE 103 03/28/2023    CHLORIDE 104 03/27/2023    CO2 21 (L) 03/28/2023    CO2 23 03/27/2023    BUN 19.5 03/28/2023    BUN 20.0 03/27/2023    CR 0.89 03/28/2023    CR 0.91 03/27/2023     (H) 03/28/2023     (H) 03/28/2023     COAGS:   Lab Results   Component Value Date    PTT 25 07/23/2007    INR 1.01 07/15/2022     POC: No results found for: \"BGM\", \"HCG\", \"HCGS\"  HEPATIC: No results found for: \"ALBUMIN\", \"PROTTOTAL\", \"ALT\", \"AST\", \"GGT\", \"ALKPHOS\", \"BILITOTAL\", \"BILIDIRECT\", \"DELICIA\"  OTHER:   Lab Results   Component Value Date    VETO 9.2 03/28/2023       Anesthesia Plan    ASA Status:  3       Anesthesia Type: General.     - Airway: ETT   Induction: Intravenous.   Maintenance: Balanced.        Consents    Anesthesia Plan(s) and associated risks, benefits, and realistic alternatives discussed. Questions answered and patient/representative(s) expressed understanding.     - Discussed: Risks, Benefits and Alternatives for BOTH SEDATION and the PROCEDURE were discussed     - Discussed with:  Patient      - Extended Intubation/Ventilatory Support Discussed: No.      - Patient is DNR/DNI Status: No     Use of blood products discussed: No .     Postoperative Care    Pain management: IV analgesics, Multi-modal analgesia, Oral pain medications, Peripheral nerve block (Single Shot).   PONV prophylaxis: Ondansetron (or other 5HT-3), Dexamethasone or Solumedrol     Comments:               Martha Martin MD    I have reviewed the pertinent notes and labs in the chart from the past 30 days and (re)examined the patient.  Any updates or changes from those notes are reflected in this note.             # Drug Induced Platelet Defect: home medication list includes an antiplatelet medication      "

## 2024-05-13 NOTE — DISCHARGE INSTRUCTIONS
To contact a doctor, call Dr Tee Espinoza [facility] [phone]                                               or:     [phone] and ask for the Resident On Call for:          Orthopedics (answered 24 hours a day)   Emergency Departments:  [facility] Department: [phone]     [facility]: [phone]

## 2024-05-13 NOTE — BRIEF OP NOTE
[facility]    Brief Operative Note    Pre-operative diagnosis: Rupture of left quadriceps tendon, subsequent encounter [U89.605E]  Post-operative diagnosis Same as pre-operative diagnosis    Procedure: Repair left quadricep tendon, Left - Leg    Surgeon: Surgeons and Role:     * Tee Espinoza MD - Primary  Anesthesia: General   Estimated Blood Loss: Less than 10 ml    Drains: None  Specimens: * No specimens in log *  Findings:   None.  Complications: None.  Implants: * No implants in log *      Post-op Plan: Aquacel until follow-up  WB status: FWB  Device:  hinged knee brace locked in extension for 2 weeks and then progress flexion with helpf of PT  DVT Prophylaxis: ASA 81mg BID x 4wks  Follow-up:  2 weeks with Dr. Espinoza/SHEREEN for wound check

## 2024-05-14 NOTE — ANESTHESIA POSTPROCEDURE EVALUATION
Patient: Delmis Parrish    Procedure: Procedure(s):  Repair left quadricep tendon       Anesthesia Type:  General    Note:  Disposition: Outpatient   Postop Pain Control: Uneventful            Sign Out: Well controlled pain   PONV: No   Neuro/Psych: Uneventful            Sign Out: Acceptable/Baseline neuro status   Airway/Respiratory: Uneventful            Sign Out: Acceptable/Baseline resp. status   CV/Hemodynamics: Uneventful            Sign Out: Acceptable CV status; No obvious hypovolemia; No obvious fluid overload   Other NRE: NONE   DID A NON-ROUTINE EVENT OCCUR? No           Last vitals:  Vitals Value Taken Time   /87 05/13/24 1545   Temp 36  C (96.8  F) 05/13/24 1544   Pulse 66 05/13/24 1558   Resp 16 05/13/24 1558   SpO2 90 % 05/13/24 1559   Vitals shown include unfiled device data.    Electronically Signed By: Mel Diaz MD  May 13, 2024  8:00 PM

## 2024-05-29 ENCOUNTER — OFFICE VISIT (OUTPATIENT)
Dept: ORTHOPEDICS | Facility: CLINIC | Age: 74
End: 2024-05-29
Payer: COMMERCIAL

## 2024-05-29 DIAGNOSIS — Z96.652 HISTORY OF TOTAL KNEE ARTHROPLASTY, LEFT: ICD-10-CM

## 2024-05-29 DIAGNOSIS — S76.112D RUPTURE OF LEFT QUADRICEPS TENDON, SUBSEQUENT ENCOUNTER: Primary | ICD-10-CM

## 2024-05-29 PROCEDURE — 99024 POSTOP FOLLOW-UP VISIT: CPT | Performed by: ORTHOPAEDIC SURGERY

## 2024-05-29 NOTE — NURSING NOTE
Reason For Visit:   Chief Complaint   Patient presents with    Surgical Followup     2 wk post-op follow up repair left quadricep tendon DOS 5/13/24.          73 year old  1950      Primary MD: Monie Dockery        There were no vitals taken for this visit.    Pain Assessment  Patient Currently in Pain: Yes  0-10 Pain Scale: 5 (with walking)  Primary Pain Location:  (left distal femur)            Rafael Rodriguez ATC

## 2024-05-29 NOTE — LETTER
5/29/2024         RE: Delmis Parrish  [address] [facility] [address]        Dear Colleague,    Thank you for referring your patient, Delmis Parrish, to the [facility] [place]. Please see a copy of my visit note below.    I was present with the resident during the history and exam.  I discussed the case with the resident and agree with the findings as documented in the assessment and plan.    Orthopedic surgery clinic follow-up note    Patient is an otherwise healthy 73-year-old female who returns to clinic today for routine 2-week follow-up after undergoing open repair of her left quad tendon.  Since her surgery, she has been doing very well.  She has been tolerating her brace well.  It has been locked in extension.  She has been weightbearing as tolerated in this.  She is only requiring Tylenol for pain at this point.  Denies any issues with her wound.  She has otherwise been feeling quite well.  No major concerns today.  She has not started formal physical therapy yet as directed.    Exam  -Healthy, typical appearing 73-year-old female  Focused exam of the left lower extremity  -Very well-healed surgical incision without any evidence of redness, warmth, swelling, drainage  -Able to easily straight leg raise.  Range of motion from 0 to 20 degrees passively  -Motor/sensory exam grossly intact  -Extremities are warm well-perfused, cap refill less than 2 seconds    Assessment/plan:  Delmis is an otherwise healthy 73-year-old female who is approximately 2 weeks status post open repair of her left quadriceps tendon.  She has been progressing well in her postoperative course.  At this point, we will plan to proceed with physical therapy and advancing her range of motion.  We will plan to have her be 0 to 30 degrees for the next 2 weeks, followed by 0 to 40 degrees for the subsequent 2 weeks, followed by 20 degrees every 1 week until she reaches 90 degrees.  She will  complete physical therapy on an outpatient basis.  Will plan to have her return to clinic in approximately 4 weeks for a 6-week follow-up visit.  Patient expressed her understanding and agreement with this assessment plan.  All questions answered today.  No further concerns.    This patient was personally seen and evaluated Dr. Espinoza who is in agreement with this assessment and plan.    Alejandro Watson MD  Orthopaedic Surgery PGY-4

## 2024-05-29 NOTE — PROGRESS NOTES
Orthopedic surgery clinic follow-up note    Patient is an otherwise healthy 73-year-old female who returns to clinic today for routine 2-week follow-up after undergoing open repair of her left quad tendon.  Since her surgery, she has been doing very well.  She has been tolerating her brace well.  It has been locked in extension.  She has been weightbearing as tolerated in this.  She is only requiring Tylenol for pain at this point.  Denies any issues with her wound.  She has otherwise been feeling quite well.  No major concerns today.  She has not started formal physical therapy yet as directed.    Exam  -Healthy, typical appearing 73-year-old female  Focused exam of the left lower extremity  -Very well-healed surgical incision without any evidence of redness, warmth, swelling, drainage  -Able to easily straight leg raise.  Range of motion from 0 to 20 degrees passively  -Motor/sensory exam grossly intact  -Extremities are warm well-perfused, cap refill less than 2 seconds    Assessment/plan:  Delmis is an otherwise healthy 73-year-old female who is approximately 2 weeks status post open repair of her left quadriceps tendon.  She has been progressing well in her postoperative course.  At this point, we will plan to proceed with physical therapy and advancing her range of motion.  We will plan to have her be 0 to 30 degrees for the next 2 weeks, followed by 0 to 40 degrees for the subsequent 2 weeks, followed by 20 degrees every 1 week until she reaches 90 degrees.  She will complete physical therapy on an outpatient basis.  Will plan to have her return to clinic in approximately 4 weeks for a 6-week follow-up visit.  Patient expressed her understanding and agreement with this assessment plan.  All questions answered today.  No further concerns.    This patient was personally seen and evaluated Dr. Espinoza who is in agreement with this assessment and plan.    Alejandro Watson MD  Orthopaedic Surgery PGY-4

## 2024-07-04 ENCOUNTER — TRANSFERRED RECORDS (OUTPATIENT)
Dept: HEALTH INFORMATION MANAGEMENT | Facility: CLINIC | Age: 74
End: 2024-07-04
Payer: COMMERCIAL

## 2024-07-10 ENCOUNTER — OFFICE VISIT (OUTPATIENT)
Dept: ORTHOPEDICS | Facility: CLINIC | Age: 74
End: 2024-07-10
Payer: COMMERCIAL

## 2024-07-10 DIAGNOSIS — S76.112S QUADRICEPS TENDON RUPTURE, LEFT, SEQUELA: ICD-10-CM

## 2024-07-10 DIAGNOSIS — Z96.652 HISTORY OF TOTAL KNEE ARTHROPLASTY, LEFT: Primary | ICD-10-CM

## 2024-07-10 PROCEDURE — 99024 POSTOP FOLLOW-UP VISIT: CPT | Performed by: ORTHOPAEDIC SURGERY

## 2024-07-10 NOTE — LETTER
7/10/2024      Delmis Parrish  [address]  [place] [address]      Dear Colleague,    Thank you for referring your patient, Delmis Parrish, to the [facility] [place]. Please see a copy of my visit note below.    I was present with the resident during the history and exam.  I discussed the case with the resident and agree with the findings as documented in the assessment and plan.    Orthopaedic Clinic Note    Surgery:   Left TKA 3/27/23  Left knee quadricep tendon repair 5/13/24  Pathology: Quadricep tendon tear left knee    Chief Complaint: Follow up    History of Present Illness: Delmis is a 73yoF presents today for follow up 8 weeks s/p the most recent above stated procedure. Continues to be WBAT in HKB unlocked at this time. Continues to wear it at all times including at night. Working with PT and making progress. She did hear a pop about a week ago while rolling over in bed and has been having difficulty straightening her knee out on her own. She was in the hospital at that time with fever and pneumonia. Denies any falls that she remembers. Denies new numbness or tingling.     Exam  There were no vitals taken for this visit.  General: awake, alert, no acute distress  Respiratory: nonlabored on room air  Cardiovascular: regular rate and rhythm by peripheral pulse  MSK  LLE:   No obvious deformity, skin intact   Obvious lateral subluxation of the patella with knee extension. Unable to independently extend the leg. Able to SLR once the knee is already extended. Palpable defect in the quad region with associated significant swelling.    SILT dp/sp/t/s/s nerve distributions   Fires EHL/FHL/TA/Gsc 5/5 strength   DP and PT 2+    Imaging:   No new imaging obtained today     Assessment: Delmis is a 73yoF who is 8 weeks s/p the most recent procedure listed above.      Plan: Discussed with patient her clinical findings and concern for re-rupture of her quad. Expressed  disappointment and provided patient with different treatment options. She has a trip planned in mid August. Discussed with patient that we would likely be able to do revision with reconstruction surgery by the end of the month. Patient understands and is in agreement with the plan.     Seen and discussed with Dr Espinoza who is in agreement with this assessment and plan    Stephen Morris MD  Orthopaedic Surgery, PGY4    Sincerely,        Tee Espinoza MD

## 2024-07-10 NOTE — PROGRESS NOTES
Orthopaedic Clinic Note    Surgery:   Left TKA 3/27/23  Left knee quadricep tendon repair 5/13/24  Pathology: Quadricep tendon tear left knee    Chief Complaint: Follow up    History of Present Illness: Delmis is a 73yoF presents today for follow up 8 weeks s/p the most recent above stated procedure. Continues to be WBAT in HKB unlocked at this time. Continues to wear it at all times including at night. Working with PT and making progress. She did hear a pop about a week ago while rolling over in bed and has been having difficulty straightening her knee out on her own. She was in the hospital at that time with fever and pneumonia. Denies any falls that she remembers. Denies new numbness or tingling.     Exam  There were no vitals taken for this visit.  General: awake, alert, no acute distress  Respiratory: nonlabored on room air  Cardiovascular: regular rate and rhythm by peripheral pulse  MSK  LLE:   No obvious deformity, skin intact   Obvious lateral subluxation of the patella with knee extension. Unable to independently extend the leg. Able to SLR once the knee is already extended. Palpable defect in the quad region with associated significant swelling.    SILT dp/sp/t/s/s nerve distributions   Fires EHL/FHL/TA/Gsc 5/5 strength   DP and PT 2+    Imaging:   No new imaging obtained today     Assessment: Delmis is a 73yoF who is 8 weeks s/p the most recent procedure listed above.      Plan: Discussed with patient her clinical findings and concern for re-rupture of her quad. Expressed disappointment and provided patient with different treatment options. She has a trip planned in mid August. Discussed with patient that we would likely be able to do revision with reconstruction surgery by the end of the month. Patient understands and is in agreement with the plan.     Seen and discussed with Dr Espinoza who is in agreement with this assessment and plan    Stephen Morris MD  Orthopaedic Surgery, PGY4

## 2024-07-10 NOTE — NURSING NOTE
Reason For Visit:   Chief Complaint   Patient presents with    RECHECK     Post op check DOS 5/13/2024, Repair left quadricep tendon        There were no vitals taken for this visit.    Pain Assessment  Patient Currently in Pain: Yes  Patient's Stated Pain Goal: 2  0-10 Pain Scale: 2  Primary Pain Location: Leg (Left)  Pain Descriptors: Nedra Mijares CMA

## 2024-07-17 DIAGNOSIS — Z96.652 HISTORY OF TOTAL KNEE ARTHROPLASTY, LEFT: Primary | ICD-10-CM

## 2024-07-22 ENCOUNTER — TELEPHONE (OUTPATIENT)
Dept: ORTHOPEDICS | Facility: CLINIC | Age: 74
End: 2024-07-22
Payer: COMMERCIAL

## 2024-07-22 DIAGNOSIS — Z96.652 STATUS POST TOTAL LEFT KNEE REPLACEMENT: Primary | ICD-10-CM

## 2024-07-22 NOTE — TELEPHONE ENCOUNTER
Patient is scheduled for surgery with Dr. Espinoza    Spoke with: Delmis     Date of Surgery: 8/19/24    [facility]    Informed patient they will need an adult  Yes    Pre op with Provider PCP, patient will schedule with Dr. Dockery or [facility] if unavailable.    Additional imaging/appointments: POP Made; 9/4/24 at 10:30AM.     Surgery packet: Received previously per patient.      Additional comments: Added to waiting list for CX on 8/12/24.         Suzan Klein on 7/22/2024 at 12:46 PM

## 2024-07-26 ENCOUNTER — ANCILLARY PROCEDURE (OUTPATIENT)
Dept: CT IMAGING | Facility: CLINIC | Age: 74
End: 2024-07-26
Attending: ORTHOPAEDIC SURGERY
Payer: COMMERCIAL

## 2024-07-26 DIAGNOSIS — Z96.652 HISTORY OF TOTAL KNEE ARTHROPLASTY, LEFT: ICD-10-CM

## 2024-07-26 PROCEDURE — 73700 CT LOWER EXTREMITY W/O DYE: CPT | Mod: LT | Performed by: RADIOLOGY

## 2024-07-31 ENCOUNTER — VIRTUAL VISIT (OUTPATIENT)
Dept: ORTHOPEDICS | Facility: CLINIC | Age: 74
End: 2024-07-31
Payer: COMMERCIAL

## 2024-07-31 DIAGNOSIS — S76.112S QUADRICEPS TENDON RUPTURE, LEFT, SEQUELA: Primary | ICD-10-CM

## 2024-07-31 PROCEDURE — 99024 POSTOP FOLLOW-UP VISIT: CPT | Mod: 93 | Performed by: PHYSICIAN ASSISTANT

## 2024-07-31 NOTE — LETTER
7/31/2024      Delmis Parrish  [address]  [place] [address]      Dear Colleague,    Thank you for referring your patient, Delmis Parrish, to the [facility] [place]. Please see a copy of my visit note below.    Virtual Visit Details    Type of service:  Telephone Visit   Phone call duration: 13 minutes   Originating Location (pt. Location): Home    Distant Location (provider location):  On-site    Chief Complaint: left knee - discuss CT results for malalignment    HPI: Delmis is a 73-year-old female who I am speaking with by phone today.  She has a history of a left total knee arthroplasty.  She has had recurrent left quadriceps tendon rupture.  It was advised she get a CT of the lower extremity to assess her lower extremity rotation and the positioning of the prosthesis.  She is scheduled for a revision of her left quadriceps tendon rupture with possible hamstring allograft the week of August 19.  She has questions about the procedure and recovery.  She is having a lot of pain and swelling in the knee.  She is using a brace.  No other concerns.  We are speaking by phone today.    Imaging: CT of the lower extremities for alignment from 7/26/24 shows:  IMPRESSION:   1. Left femoral component anatomic transepicondylar line is 3 degrees  externally rotated relative to posterior condylar line.  2. Femoral anteversion on right is 9 degrees and left is -1  degrees.  3. Tibial torsion on right is is 27 degrees and left is 28 degrees.  4. Femoral tibial rotation as above.  5. Asymmetric hypoattenuating swelling with possible fluid collection  within the left vastus medialis, incompletely visualized    Plan: I think overall she does not had significant malalignment from what I am interpreting.  However, I would like this reviewed by Dr. Espinoza and his partner Dr. Walls and I have sent them an email for their interpretation, which myself or our nurse will follow-up with  Delmis on any further recommendations.  Delmis is on a wait list to be moved up to for surgery, as she feels quite miserable.  She should continue to ice and use her brace.  I did explain the process of using a hamstring allograft and the recovery from that.  We talked about some of the other findings in her CT regarding arthritis of the SI joints, the symphysis pubis, and her ankles.  She has had numerous ankle surgeries in the past and is aware of those.  All questions were answered today.  She will call with any further concerns.  I will relay any information from Dr. Espinoza or his partner if there is any change in plan.      Again, thank you for allowing me to participate in the care of your patient.        Sincerely,        Cheryl Ayala PA-C

## 2024-07-31 NOTE — PROGRESS NOTES
Virtual Visit Details    Type of service:  Telephone Visit   Phone call duration: 13 minutes   Originating Location (pt. Location): Home    Distant Location (provider location):  On-site    Chief Complaint: left knee - discuss CT results for malalignment    HPI: Delmis is a 73-year-old female who I am speaking with by phone today.  She has a history of a left total knee arthroplasty.  She has had recurrent left quadriceps tendon rupture.  It was advised she get a CT of the lower extremity to assess her lower extremity rotation and the positioning of the prosthesis.  She is scheduled for a revision of her left quadriceps tendon rupture with possible hamstring allograft the week of August 19.  She has questions about the procedure and recovery.  She is having a lot of pain and swelling in the knee.  She is using a brace.  No other concerns.  We are speaking by phone today.    Imaging: CT of the lower extremities for alignment from 7/26/24 shows:  IMPRESSION:   1. Left femoral component anatomic transepicondylar line is 3 degrees  externally rotated relative to posterior condylar line.  2. Femoral anteversion on right is 9 degrees and left is -1  degrees.  3. Tibial torsion on right is is 27 degrees and left is 28 degrees.  4. Femoral tibial rotation as above.  5. Asymmetric hypoattenuating swelling with possible fluid collection  within the left vastus medialis, incompletely visualized    Plan: I think overall she does not had significant malalignment from what I am interpreting.  However, I would like this reviewed by Dr. Espinoza and his partner Dr. Walls and I have sent them an email for their interpretation, which myself or our nurse will follow-up with Delmis on any further recommendations.  Delmis is on a wait list to be moved up to for surgery, as she feels quite miserable.  She should continue to ice and use her brace.  I did explain the process of using a hamstring allograft and the recovery from that.  We  talked about some of the other findings in her CT regarding arthritis of the SI joints, the symphysis pubis, and her ankles.  She has had numerous ankle surgeries in the past and is aware of those.  All questions were answered today.  She will call with any further concerns.  I will relay any information from Dr. Espinoza or his partner if there is any change in plan.

## 2024-08-08 ENCOUNTER — TELEPHONE (OUTPATIENT)
Dept: ORTHOPEDICS | Facility: CLINIC | Age: 74
End: 2024-08-08
Payer: COMMERCIAL

## 2024-08-08 NOTE — TELEPHONE ENCOUNTER
Other: pt is calling ERIC Feliciano back.      Could we send this information to you in [employer] or would you prefer to receive a phone call?:   Patient would prefer a phone call   Okay to leave a detailed message?: Yes at Home number on file [phone] (home)

## 2024-08-08 NOTE — TELEPHONE ENCOUNTER
- A call was placed to the patient. Patient did not answer phone so a voicemail was left.     - I told the patient I was calling to go over pre-op teaching on their proposed procedure with Dr. Espinoza.     - Call back number to clinic was given and patient was told to call back and ask for Dr. Alexis Feliciano's nurse when they were able.

## 2024-08-08 NOTE — TELEPHONE ENCOUNTER
A call was placed to the patient and pre-op teaching was performed over the phone.    Teaching Flowsheet   Relevant Diagnosis: Pre-Op Teaching  Teaching Topic:      Person(s) involved in teaching:   Patient     Motivation Level:  Asks Questions: Yes  Eager to Learn: Yes  Cooperative: Yes  Receptive (willing/able to accept information): Yes  Any cultural factors/[place] beliefs that may influence understanding or compliance? No     Patient demonstrates understanding of the following:  Reason for the appointment, diagnosis and treatment plan: Yes  Knowledge of proper use of medications and conditions for which they are ordered (with special attention to potential side effects or drug interactions): Yes  Which situations necessitate calling provider and whom to contact: Yes- discussed the stoplight tool to help assist with this.      Teaching Concerns Addressed:      Proper use of surgical scrub explain: Yes    Nutritional needs and diet plan: Yes  Pain management techniques: Yes  Wound Care: Yes  How and/when to access community resources: Yes     Instructional Materials Used/Given:  a bottle of chlorhexidine soap and a surgery packet given to patient in clinic. Instructed patient to buy or get 8 ounces of antiseptic surgical soap called 4% CHG. Common name brand of this soap are Hibiclens and Exidine. I told them they can find this at their local pharmacy, clinic or retail store. If they have trouble finding it, I told them to ask their pharmacist to help them find a substitute.      - Important contact info/ phone numbers: emphasizing clinic number and after hours number  - Map/ location of surgery  - Medications to avoid  - Showering instructions  - Stop light tool    Additionally the following was discussed with patient:  -  will be driving the patient to surgery and staying with them for 24 hours.       -Next step: pre op completed and surgery 8/19    Time spent with patient: 15 minutes.

## 2024-08-16 RX ORDER — ZOLPIDEM TARTRATE 5 MG/1
5 TABLET ORAL
COMMUNITY

## 2024-08-18 ENCOUNTER — ANESTHESIA EVENT (OUTPATIENT)
Dept: SURGERY | Facility: CLINIC | Age: 74
End: 2024-08-18
Payer: COMMERCIAL

## 2024-08-18 NOTE — ANESTHESIA PREPROCEDURE EVALUATION
Anesthesia Pre-Procedure Evaluation    Patient: Delmis Parrish   MRN: [id number] : 1950        Procedure : Procedure(s):  Reconstruction Left Quadricep Tendon, Possible Hamstring Tendon Autograft          Past Medical History:   Diagnosis Date     Gastroesophageal reflux disease      Generalized anxiety disorder      Insomnia      Insomnia      LVH (left ventricular hypertrophy)      Major depressive disorder, recurrent episode, mild (H24)      Renovascular hypertension       Past Surgical History:   Procedure Laterality Date     ARTHROPLASTY KNEE Left 3/27/2023    Procedure: ARTHROPLASTY, KNEE, TOTAL LEFT;  Surgeon: Tee Espinoza MD;  Location: [facility]     BIOPSY BONE LOWER EXTREMITY Left 2022    Procedure: Biopsy Left Tibia;  Surgeon: Tee Espinoza MD;  [facility]: [facility]     CHOLECYSTECTOMY       CURETTAGE AND CEMENTATION LOWER EXTREMITY, COMBINED Left 2022    Procedure: With Curettage and Allograft Placement;  Surgeon: Tee Espinoza MD;  Location: [facility]     EXC TUMR SOFT TISSUE BACK/FLANK        FLUOROGUIDE FOR SPINE INJECT        HYSTERECTOMY       REPAIR TENDON QUADRICEPS Left 2024    Procedure: Repair left quadricep tendon;  Surgeon: Tee Espinoza MD;  Location: [facility] OR      Allergies   Allergen Reactions     Adhesive Tape      Athletic tape     Fd&C Yellow #5 (Tartrazine) Nausea and Vomiting     aka darvon       Paroxetine Nausea and Vomiting     aka paxil       Sertraline Nausea and Vomiting     aka zoloft        Social History     Tobacco Use     Smoking status: Never     Smokeless tobacco: Never   Substance Use Topics     Alcohol use: Not Currently      Wt Readings from Last 1 Encounters:   24 83.4 kg (183 lb 13.8 oz)        Anesthesia Evaluation   Pt has had prior anesthetic.     No history of anesthetic complications       ROS/MED HX  ENT/Pulmonary: Comment: Bronchiectasis      Neurologic: Comment: Spinal cord stimulator     (+)    peripheral neuropathy,                            Cardiovascular: Comment: LVH, Mitral Valve Disease    (+)  hypertension- -   -  - -                                 Previous cardiac testing   Echo: Date: 2022 Results:    1. Dobutamine stress was performed.     2. Functional capacity was not assessed.     3. The patient experienced shortness of breath, fatigue and with infusion   during the stress test. Symptoms resolved in recovery.     4. Negative stress ECG for ST segment depression.     5. Normal hemodynamic response to dobutamine. Isolated PVC's.     6. Normal left ventricular systolic function with no regional wall motion   abnormalities noted at rest.   LVEF 60%.     7. Post stress, no wall motion abnormalities seen.   LV systolic size   decreases and systolic function increases appropriately. However, peak HR   was   not achieved even with atropine in addition to maximum dobutamine (peak   images   are obtained are markedly reduced heart rates which limits this study in   its   detection of ischemia).     Stress Test:  Date: Results:    ECG Reviewed:  Date: Results:    Cath:  Date: Results:      METS/Exercise Tolerance:     Hematologic:  - neg hematologic  ROS     Musculoskeletal: Comment: - Rupture of left quadriceps tendon s/p initial repair in May 2024. Re-rupture of L quadriceps tendon.   - s/p L total knee arthroplasty in March 2023        GI/Hepatic:     (+) GERD, Asymptomatic on medication,                  Renal/Genitourinary:  - neg Renal ROS     Endo:  - neg endo ROS     Psychiatric/Substance Use:     (+) psychiatric history anxiety and depression alcohol abuse      Infectious Disease:  - neg infectious disease ROS     Malignancy:  - neg malignancy ROS     Other:            Physical Exam    Airway        Mallampati: II   TM distance: > 3 FB   Neck ROM: full   Mouth opening: > 3 cm    Respiratory Devices and Support         Dental       (+) Minor Abnormalities - some fillings, tiny  "chips      Cardiovascular   cardiovascular exam normal          Pulmonary   pulmonary exam normal            OUTSIDE LABS:  CBC:   Lab Results   Component Value Date    WBC 12.1 (H) 03/28/2023    WBC 10.6 03/27/2023    HGB 10.9 (L) 03/28/2023    HGB 13.1 03/27/2023    HCT 33.8 (L) 03/28/2023    HCT 40.4 03/27/2023     03/28/2023     03/27/2023     BMP:   Lab Results   Component Value Date     (L) 03/28/2023     03/27/2023    POTASSIUM 4.3 03/28/2023    POTASSIUM 4.0 03/27/2023    CHLORIDE 103 03/28/2023    CHLORIDE 104 03/27/2023    CO2 21 (L) 03/28/2023    CO2 23 03/27/2023    BUN 19.5 03/28/2023    BUN 20.0 03/27/2023    CR 0.89 03/28/2023    CR 0.91 03/27/2023    GLC 99 05/13/2024     (H) 03/28/2023     COAGS:   Lab Results   Component Value Date    PTT 25 07/23/2007    INR 1.01 07/15/2022     POC: No results found for: \"BGM\", \"HCG\", \"HCGS\"  HEPATIC: No results found for: \"ALBUMIN\", \"PROTTOTAL\", \"ALT\", \"AST\", \"GGT\", \"ALKPHOS\", \"BILITOTAL\", \"BILIDIRECT\", \"DELICIA\"  OTHER:   Lab Results   Component Value Date    VETO 9.2 03/28/2023       Anesthesia Plan    ASA Status:  3       Anesthesia Type: General.     - Airway: LMA   Induction: Intravenous.   Maintenance: Balanced.   Techniques and Equipment:     - Lines/Monitors: BIS     Consents    Anesthesia Plan(s) and associated risks, benefits, and realistic alternatives discussed. Questions answered and patient/representative(s) expressed understanding.     - Discussed: Risks, Benefits and Alternatives for BOTH SEDATION and the PROCEDURE were discussed     - Discussed with:  Patient       Use of blood products discussed: Yes.     - Discussed with: Patient.     Postoperative Care    Pain management: IV analgesics, Oral pain medications, Peripheral nerve block (Single Shot), Multi-modal analgesia.   PONV prophylaxis: Ondansetron (or other 5HT-3), Dexamethasone or Solumedrol     Comments:               Carmen Dyson MD    I have reviewed the " pertinent notes and labs in the chart from the past 30 days and (re)examined the patient.  Any updates or changes from those notes are reflected in this note.

## 2024-08-19 ENCOUNTER — ANESTHESIA (OUTPATIENT)
Dept: SURGERY | Facility: CLINIC | Age: 74
End: 2024-08-19
Payer: COMMERCIAL

## 2024-08-19 ENCOUNTER — HOSPITAL ENCOUNTER (OUTPATIENT)
Facility: CLINIC | Age: 74
Discharge: HOME | End: 2024-08-19
Attending: ORTHOPAEDIC SURGERY | Admitting: ORTHOPAEDIC SURGERY
Payer: COMMERCIAL

## 2024-08-19 VITALS
DIASTOLIC BLOOD PRESSURE: 95 MMHG | HEIGHT: 68 IN | WEIGHT: 187.61 LBS | HEART RATE: 69 BPM | SYSTOLIC BLOOD PRESSURE: 154 MMHG | OXYGEN SATURATION: 95 % | RESPIRATION RATE: 14 BRPM | BODY MASS INDEX: 28.43 KG/M2 | TEMPERATURE: 97.7 F

## 2024-08-19 DIAGNOSIS — S76.112S QUADRICEPS TENDON RUPTURE, LEFT, SEQUELA: Primary | ICD-10-CM

## 2024-08-19 DIAGNOSIS — M17.12 PRIMARY OSTEOARTHRITIS OF LEFT KNEE: ICD-10-CM

## 2024-08-19 PROCEDURE — 710N000010 HC RECOVERY PHASE 1, LEVEL 2, PER MIN: Performed by: ORTHOPAEDIC SURGERY

## 2024-08-19 PROCEDURE — 710N000012 HC RECOVERY PHASE 2, PER MINUTE: Performed by: ORTHOPAEDIC SURGERY

## 2024-08-19 PROCEDURE — 272N000001 HC OR GENERAL SUPPLY STERILE: Performed by: ORTHOPAEDIC SURGERY

## 2024-08-19 PROCEDURE — 250N000011 HC RX IP 250 OP 636

## 2024-08-19 PROCEDURE — 370N000017 HC ANESTHESIA TECHNICAL FEE, PER MIN: Performed by: ORTHOPAEDIC SURGERY

## 2024-08-19 PROCEDURE — 250N000011 HC RX IP 250 OP 636: Performed by: PHYSICIAN ASSISTANT

## 2024-08-19 PROCEDURE — 258N000003 HC RX IP 258 OP 636

## 2024-08-19 PROCEDURE — 360N000076 HC SURGERY LEVEL 3, PER MIN: Performed by: ORTHOPAEDIC SURGERY

## 2024-08-19 PROCEDURE — 99100 ANES PT EXTEME AGE<1 YR&>70: CPT | Performed by: ANESTHESIOLOGY

## 2024-08-19 PROCEDURE — 27385 REPAIR OF THIGH MUSCLE: CPT | Performed by: ANESTHESIOLOGY

## 2024-08-19 PROCEDURE — 250N000011 HC RX IP 250 OP 636: Performed by: ORTHOPAEDIC SURGERY

## 2024-08-19 PROCEDURE — 999N000141 HC STATISTIC PRE-PROCEDURE NURSING ASSESSMENT: Performed by: ORTHOPAEDIC SURGERY

## 2024-08-19 PROCEDURE — 250N000025 HC SEVOFLURANE, PER MIN: Performed by: ORTHOPAEDIC SURGERY

## 2024-08-19 PROCEDURE — 250N000013 HC RX MED GY IP 250 OP 250 PS 637: Performed by: PHYSICIAN ASSISTANT

## 2024-08-19 PROCEDURE — 250N000009 HC RX 250

## 2024-08-19 RX ORDER — FENTANYL CITRATE 50 UG/ML
50 INJECTION, SOLUTION INTRAMUSCULAR; INTRAVENOUS EVERY 5 MIN PRN
Status: DISCONTINUED | OUTPATIENT
Start: 2024-08-19 | End: 2024-08-19 | Stop reason: HOSPADM

## 2024-08-19 RX ORDER — DEXAMETHASONE SODIUM PHOSPHATE 4 MG/ML
4 INJECTION, SOLUTION INTRA-ARTICULAR; INTRALESIONAL; INTRAMUSCULAR; INTRAVENOUS; SOFT TISSUE
Status: DISCONTINUED | OUTPATIENT
Start: 2024-08-19 | End: 2024-08-19 | Stop reason: HOSPADM

## 2024-08-19 RX ORDER — LIDOCAINE HYDROCHLORIDE 20 MG/ML
INJECTION, SOLUTION INFILTRATION; PERINEURAL PRN
Status: DISCONTINUED | OUTPATIENT
Start: 2024-08-19 | End: 2024-08-19

## 2024-08-19 RX ORDER — ONDANSETRON 2 MG/ML
4 INJECTION INTRAMUSCULAR; INTRAVENOUS EVERY 30 MIN PRN
Status: DISCONTINUED | OUTPATIENT
Start: 2024-08-19 | End: 2024-08-19 | Stop reason: HOSPADM

## 2024-08-19 RX ORDER — ACETAMINOPHEN 325 MG/1
975 TABLET ORAL ONCE
Status: COMPLETED | OUTPATIENT
Start: 2024-08-19 | End: 2024-08-19

## 2024-08-19 RX ORDER — CEFAZOLIN SODIUM/WATER 2 G/20 ML
2 SYRINGE (ML) INTRAVENOUS SEE ADMIN INSTRUCTIONS
Status: DISCONTINUED | OUTPATIENT
Start: 2024-08-19 | End: 2024-08-19 | Stop reason: HOSPADM

## 2024-08-19 RX ORDER — OXYCODONE HYDROCHLORIDE 5 MG/1
10 TABLET ORAL
Status: DISCONTINUED | OUTPATIENT
Start: 2024-08-19 | End: 2024-08-19 | Stop reason: HOSPADM

## 2024-08-19 RX ORDER — SODIUM CHLORIDE, SODIUM LACTATE, POTASSIUM CHLORIDE, CALCIUM CHLORIDE 600; 310; 30; 20 MG/100ML; MG/100ML; MG/100ML; MG/100ML
INJECTION, SOLUTION INTRAVENOUS CONTINUOUS
Status: DISCONTINUED | OUTPATIENT
Start: 2024-08-19 | End: 2024-08-19 | Stop reason: HOSPADM

## 2024-08-19 RX ORDER — ACETAMINOPHEN 325 MG/1
650 TABLET ORAL EVERY 4 HOURS PRN
Qty: 50 TABLET | Refills: 0 | Status: SHIPPED | OUTPATIENT
Start: 2024-08-19

## 2024-08-19 RX ORDER — NALOXONE HYDROCHLORIDE 0.4 MG/ML
0.1 INJECTION, SOLUTION INTRAMUSCULAR; INTRAVENOUS; SUBCUTANEOUS
Status: DISCONTINUED | OUTPATIENT
Start: 2024-08-19 | End: 2024-08-19 | Stop reason: HOSPADM

## 2024-08-19 RX ORDER — OXYCODONE HYDROCHLORIDE 5 MG/1
5 TABLET ORAL
Status: COMPLETED | OUTPATIENT
Start: 2024-08-19 | End: 2024-08-19

## 2024-08-19 RX ORDER — ONDANSETRON 4 MG/1
4 TABLET, ORALLY DISINTEGRATING ORAL EVERY 30 MIN PRN
Status: DISCONTINUED | OUTPATIENT
Start: 2024-08-19 | End: 2024-08-19 | Stop reason: HOSPADM

## 2024-08-19 RX ORDER — ONDANSETRON 2 MG/ML
INJECTION INTRAMUSCULAR; INTRAVENOUS PRN
Status: DISCONTINUED | OUTPATIENT
Start: 2024-08-19 | End: 2024-08-19

## 2024-08-19 RX ORDER — OXYCODONE HYDROCHLORIDE 5 MG/1
5 TABLET ORAL
Status: DISCONTINUED | OUTPATIENT
Start: 2024-08-19 | End: 2024-08-19 | Stop reason: HOSPADM

## 2024-08-19 RX ORDER — ACETAMINOPHEN 325 MG/1
650 TABLET ORAL
Status: DISCONTINUED | OUTPATIENT
Start: 2024-08-19 | End: 2024-08-19 | Stop reason: HOSPADM

## 2024-08-19 RX ORDER — PROPOFOL 10 MG/ML
INJECTION, EMULSION INTRAVENOUS PRN
Status: DISCONTINUED | OUTPATIENT
Start: 2024-08-19 | End: 2024-08-19

## 2024-08-19 RX ORDER — AMOXICILLIN 250 MG
1-2 CAPSULE ORAL 2 TIMES DAILY
Qty: 30 TABLET | Refills: 0 | Status: SHIPPED | OUTPATIENT
Start: 2024-08-19

## 2024-08-19 RX ORDER — LIDOCAINE 40 MG/G
CREAM TOPICAL
Status: DISCONTINUED | OUTPATIENT
Start: 2024-08-19 | End: 2024-08-19 | Stop reason: HOSPADM

## 2024-08-19 RX ORDER — AMOXICILLIN 250 MG
1-2 CAPSULE ORAL 2 TIMES DAILY
Qty: 30 TABLET | Refills: 0 | Status: SHIPPED | OUTPATIENT
Start: 2024-08-19 | End: 2024-08-19

## 2024-08-19 RX ORDER — OXYCODONE HYDROCHLORIDE 5 MG/1
5 TABLET ORAL EVERY 6 HOURS PRN
Qty: 15 TABLET | Refills: 0 | Status: SHIPPED | OUTPATIENT
Start: 2024-08-19 | End: 2024-08-19

## 2024-08-19 RX ORDER — FENTANYL CITRATE 50 UG/ML
INJECTION, SOLUTION INTRAMUSCULAR; INTRAVENOUS PRN
Status: DISCONTINUED | OUTPATIENT
Start: 2024-08-19 | End: 2024-08-19

## 2024-08-19 RX ORDER — HYDROMORPHONE HYDROCHLORIDE 1 MG/ML
0.4 INJECTION, SOLUTION INTRAMUSCULAR; INTRAVENOUS; SUBCUTANEOUS EVERY 5 MIN PRN
Status: DISCONTINUED | OUTPATIENT
Start: 2024-08-19 | End: 2024-08-19 | Stop reason: HOSPADM

## 2024-08-19 RX ORDER — ACETAMINOPHEN 325 MG/1
650 TABLET ORAL EVERY 4 HOURS PRN
Qty: 50 TABLET | Refills: 0 | Status: SHIPPED | OUTPATIENT
Start: 2024-08-19 | End: 2024-08-19

## 2024-08-19 RX ORDER — CEFAZOLIN SODIUM/WATER 2 G/20 ML
2 SYRINGE (ML) INTRAVENOUS
Status: COMPLETED | OUTPATIENT
Start: 2024-08-19 | End: 2024-08-19

## 2024-08-19 RX ORDER — HYDROMORPHONE HYDROCHLORIDE 4 MG/ML
INJECTION, SOLUTION INTRAMUSCULAR; INTRAVENOUS; SUBCUTANEOUS PRN
Status: DISCONTINUED | OUTPATIENT
Start: 2024-08-19 | End: 2024-08-19

## 2024-08-19 RX ORDER — FENTANYL CITRATE 50 UG/ML
25 INJECTION, SOLUTION INTRAMUSCULAR; INTRAVENOUS EVERY 5 MIN PRN
Status: DISCONTINUED | OUTPATIENT
Start: 2024-08-19 | End: 2024-08-19 | Stop reason: HOSPADM

## 2024-08-19 RX ORDER — OXYCODONE HYDROCHLORIDE 5 MG/1
5 TABLET ORAL EVERY 6 HOURS PRN
Qty: 15 TABLET | Refills: 0 | Status: SHIPPED | OUTPATIENT
Start: 2024-08-19

## 2024-08-19 RX ORDER — DEXAMETHASONE SODIUM PHOSPHATE 4 MG/ML
INJECTION, SOLUTION INTRA-ARTICULAR; INTRALESIONAL; INTRAMUSCULAR; INTRAVENOUS; SOFT TISSUE PRN
Status: DISCONTINUED | OUTPATIENT
Start: 2024-08-19 | End: 2024-08-19

## 2024-08-19 RX ORDER — BUPIVACAINE HYDROCHLORIDE 2.5 MG/ML
INJECTION, SOLUTION INFILTRATION; PERINEURAL PRN
Status: DISCONTINUED | OUTPATIENT
Start: 2024-08-19 | End: 2024-08-19 | Stop reason: HOSPADM

## 2024-08-19 RX ORDER — HYDROMORPHONE HYDROCHLORIDE 1 MG/ML
0.2 INJECTION, SOLUTION INTRAMUSCULAR; INTRAVENOUS; SUBCUTANEOUS EVERY 5 MIN PRN
Status: DISCONTINUED | OUTPATIENT
Start: 2024-08-19 | End: 2024-08-19 | Stop reason: HOSPADM

## 2024-08-19 RX ADMIN — PHENYLEPHRINE HYDROCHLORIDE 200 MCG: 10 INJECTION INTRAVENOUS at 10:47

## 2024-08-19 RX ADMIN — SODIUM CHLORIDE, POTASSIUM CHLORIDE, SODIUM LACTATE AND CALCIUM CHLORIDE: 600; 310; 30; 20 INJECTION, SOLUTION INTRAVENOUS at 10:14

## 2024-08-19 RX ADMIN — FENTANYL CITRATE 50 MCG: 50 INJECTION INTRAMUSCULAR; INTRAVENOUS at 12:00

## 2024-08-19 RX ADMIN — DEXMEDETOMIDINE HYDROCHLORIDE 8 MCG: 100 INJECTION, SOLUTION INTRAVENOUS at 11:21

## 2024-08-19 RX ADMIN — Medication 2 G: at 10:09

## 2024-08-19 RX ADMIN — ACETAMINOPHEN 650 MG: 325 TABLET, FILM COATED ORAL at 13:32

## 2024-08-19 RX ADMIN — PHENYLEPHRINE HYDROCHLORIDE 100 MCG: 10 INJECTION INTRAVENOUS at 10:25

## 2024-08-19 RX ADMIN — PHENYLEPHRINE HYDROCHLORIDE 100 MCG: 10 INJECTION INTRAVENOUS at 11:03

## 2024-08-19 RX ADMIN — ONDANSETRON 4 MG: 2 INJECTION INTRAMUSCULAR; INTRAVENOUS at 10:13

## 2024-08-19 RX ADMIN — DEXAMETHASONE SODIUM PHOSPHATE 4 MG: 4 INJECTION, SOLUTION INTRAMUSCULAR; INTRAVENOUS at 10:13

## 2024-08-19 RX ADMIN — PROPOFOL 200 MG: 10 INJECTION, EMULSION INTRAVENOUS at 10:09

## 2024-08-19 RX ADMIN — PHENYLEPHRINE HYDROCHLORIDE 100 MCG: 10 INJECTION INTRAVENOUS at 10:56

## 2024-08-19 RX ADMIN — OXYCODONE HYDROCHLORIDE 5 MG: 5 TABLET ORAL at 13:32

## 2024-08-19 RX ADMIN — PHENYLEPHRINE HYDROCHLORIDE 100 MCG: 10 INJECTION INTRAVENOUS at 11:20

## 2024-08-19 RX ADMIN — PHENYLEPHRINE HYDROCHLORIDE 100 MCG: 10 INJECTION INTRAVENOUS at 10:31

## 2024-08-19 RX ADMIN — FENTANYL CITRATE 50 MCG: 50 INJECTION INTRAMUSCULAR; INTRAVENOUS at 10:34

## 2024-08-19 RX ADMIN — LIDOCAINE HYDROCHLORIDE 90 MG: 20 INJECTION, SOLUTION INFILTRATION; PERINEURAL at 10:09

## 2024-08-19 RX ADMIN — HYDROMORPHONE HYDROCHLORIDE 0.5 MG: 4 INJECTION, SOLUTION INTRAMUSCULAR; INTRAVENOUS; SUBCUTANEOUS at 11:16

## 2024-08-19 ASSESSMENT — ACTIVITIES OF DAILY LIVING (ADL)
ADLS_ACUITY_SCORE: 33

## 2024-08-19 NOTE — ANESTHESIA PROCEDURE NOTES
Airway       Patient location during procedure: OR  Staff -        Anesthesiologist:  Toni Valladares MD       Resident/Fellow: Carmen Dyson MD       Performed By: residentIndications and Patient Condition       Indications for airway management: damien-procedural       Induction type:intravenous       Mask difficulty assessment: 0 - not attempted    Final Airway Details       Final airway type: supraglottic airway    Supraglottic Airway Details        Type: LMA       Brand: Air-Q       LMA size: 5    Post intubation assessment        Placement verified by: capnometry, equal breath sounds and chest rise        Number of attempts at approach: 1       Number of other approaches attempted: 0       Secured with: tape       Ease of procedure: easy       Dentition: Intact and Unchanged

## 2024-08-19 NOTE — ANESTHESIA CARE TRANSFER NOTE
Patient: Delmis Parrish    Procedure: Procedure(s):  Reconstruction Left Quadricep Tendon       Diagnosis: Quadriceps tendon rupture, left, sequela [S76.112S]  Diagnosis Additional Information: No value filed.    Anesthesia Type:   General     Note:    Oropharynx: oropharynx clear of all foreign objects and spontaneously breathing  Level of Consciousness: awake  Oxygen Supplementation: face mask  Level of Supplemental Oxygen (L/min / FiO2): 6  Independent Airway: airway patency satisfactory and stable  Dentition: dentition unchanged  Vital Signs Stable: post-procedure vital signs reviewed and stable  Report to RN Given: handoff report given  Patient transferred to: PACU  Comments: -VSS  Handoff Report: Identifed the Patient, Identified the Reponsible Provider, Reviewed the pertinent medical history, Discussed the surgical course, Reviewed Intra-OP anesthesia mangement and issues during anesthesia, Set expectations for post-procedure period and Allowed opportunity for questions and acknowledgement of understanding      Vitals:  Vitals Value Taken Time   /82 08/19/24 1145   Temp     Pulse 70 08/19/24 1147   Resp 9 08/19/24 1145   SpO2 95 % 08/19/24 1147   Vitals shown include unfiled device data.    Electronically Signed By: Krystle Mason MD  August 19, 2024  11:47 AM

## 2024-08-19 NOTE — ANESTHESIA POSTPROCEDURE EVALUATION
Patient: Delmis Parrish    Procedure: Procedure(s):  Reconstruction Left Quadricep Tendon       Anesthesia Type:  General    Note:  Disposition: Outpatient   Postop Pain Control: Uneventful            Sign Out: Well controlled pain   PONV: No   Neuro/Psych: Uneventful            Sign Out: Acceptable/Baseline neuro status   Airway/Respiratory: Uneventful            Sign Out: Acceptable/Baseline resp. status   CV/Hemodynamics: Uneventful            Sign Out: Acceptable CV status; No obvious hypovolemia; No obvious fluid overload   Other NRE:    DID A NON-ROUTINE EVENT OCCUR?        Last vitals:  Vitals Value Taken Time   /80 08/19/24 1300   Temp 37  C (98.6  F) 08/19/24 1140   Pulse 71 08/19/24 1244   Resp 28 08/19/24 1245   SpO2 95 % 08/19/24 1302   Vitals shown include unfiled device data.    Electronically Signed By: Hunter Ribeiro MD  August 19, 2024  2:29 PM

## 2024-08-19 NOTE — BRIEF OP NOTE
[facility]    Brief Operative Note    Pre-operative diagnosis: Quadriceps tendon rupture, left, sequela [S73.112S]  Post-operative diagnosis Same as pre-operative diagnosis    Procedure: Reconstruction Left Quadricep Tendon, Left - Leg    Surgeon: Surgeons and Role:     * Tee Espinoza MD - Primary     * Cheryl Ayala PA-C - Assisting     * Gregory Butcher MD - Resident - Assisting  Anesthesia: General   Estimated Blood Loss: Less than 10 ml    Drains: None  Specimens: * No specimens in log *  Findings:   None.  Complications: None.  Implants: * No implants in log *        Post-op Plan: Aquacel x 7 days  WB status:  FWB  Device:  HKB on at all times locked in ext x 4 wks  DVT Prophylaxis:  Not needed   Follow-up:  2 weeks with Dr. Espinoza/SHEREEN for wound check

## 2024-08-21 NOTE — OP NOTE
DATE OF SURGERY: 8/19/2024    PREOPERATIVE DIAGNOSIS: Left quadricep tendon failure    POSTOPERATIVE DIAGNOSIS: Left quadricep tendon failure    PROCEDURE: Repair left quadricep tendon    SURGEON: Tee Espinoza MD     ASSISTANT: RADAMES Rodriguez, Gregory Crowder MD    PATIENT HISTORY: This patient has a history of total knee.  She had failure of her quadricep tendon repair previously and this was addressed with the revision.  She now seems to have increased subluxation of the patella and a palpable defect in the same location consistent with the failure of her quadricep tendon yet again.  She understands the risks of infection bleeding pain numbness tingling stiffness and weakness.    DESCRIPTION OF PROCEDURE: The patient underwent successful induction of anesthesia.  The left leg was washed and sterilely prepped and draped.  We opened up the total knee incision.  It seems we got into the subcutaneous tissue we had fluid from the joint evident at least at the level of the prepatellar bursa.  The quadriceps tendon was widely  at the area of the surgical repair.  It was still attached to the patella laterally.  We noted that the site of the single stitch of #5 Tycron that there was some good scar formation but above that where the Vicryl had been there was a wide separation.  I sharply removed scar tissue at the edge of the defect to try to get back to more normal tendon.  We did a lateral capsular release and made sure there was good excursion of the muscle of the quadricep both distally and medially to laterally.  I imbricated the stretch out tissues a little bit and so these closed with interrupted #5 Tycron.  I felt like we were able to do an excellent repair.  I was able to even flex the knee without putting some undue stress on the tendon.  We copiously irrigated prior to closure and after closure.  We closed the subcutaneous tissues with Vicryl and the skin with Monocryl followed by  Steri-Strips and a sterile dry dressing.  We will place the patient into a hinged knee brace locked in extension for probably about 4 weeks given her previous difficulties in getting this to heal.  There were no complications.  I was present for all critical portions of the procedure.    Tee Espinoza MD

## 2024-09-04 ENCOUNTER — OFFICE VISIT (OUTPATIENT)
Dept: ORTHOPEDICS | Facility: CLINIC | Age: 74
End: 2024-09-04
Payer: COMMERCIAL

## 2024-09-04 DIAGNOSIS — S76.112S QUADRICEPS TENDON RUPTURE, LEFT, SEQUELA: Primary | ICD-10-CM

## 2024-09-04 PROCEDURE — 99024 POSTOP FOLLOW-UP VISIT: CPT | Performed by: ORTHOPAEDIC SURGERY

## 2024-09-04 NOTE — LETTER
9/4/2024      Delmis Parrish  [address]  [place] [address]      Dear Colleague,    Thank you for referring your patient, Delmis Parrish, to the [facility] [place]. Please see a copy of my visit note below.    Chief Complaint: wound check  DATE OF SURGERY: 8/19/2024  POSTOPERATIVE DIAGNOSIS: Left quadricep tendon failure  PROCEDURE: Repair left quadricep tendon  SURGEON: Tee Espinoza MD     HPI: Delmis is a 74 year old female here for follow-up 2.5 weeks s/p above procedure.  She has a Left TKA.  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  She reports she is doing well, minimal discomfort.  She is wearing the brace locked in extension and is FWB.  She cannot drive because she cannot get in and out of her vehicle with a straight leg.  She has PT set up to start in 2 weeks.  No other concerns.    Physical Exam: Delmis is a 74 year old female who is alert and oriented and in no distress.  She has a mildly antalgic gait without gait assistance.  Hinged knee braced locked in extension and fitting appropriately.  Left knee incision healing well with no erythema or drainage.  Mild swelling.  Mild swelling of LLE with no calf tenderness.  Patient is able to do a quad set.  We did not have her do a straight leg raise today.  NV intact distally.    Impression: 74 year old female with left TKA s/p repair of recurrent quad tendon tear    Plan: Patient can shower and get the incision wet.  No soaking in a tub or pool for 2 weeks.  OK to use vitamin E oil or cocoa butter on the incisions.  Cover incisions as needed for comfort.  Continue to ice and elevate for swelling control.  Start to work with PT for range of motion and strengthening, as well as working on home exercises. In 1 week, progress to 30 degrees, and then every weeks after, progress 15-30 degrees, no forceful flexion.  I showed her how to unlock the brace. Follow-up in 6 weeks for check of progress.  Long-term  post-op plan was reviewed below.  All questions were answered today.    Patient was also examined by Dr. Espinoza and he agrees with the plan of care.       I was present with the PA during the history and exam.  I discussed the case with the PA and agree with the findings as documented in the assessment and plan.      Again, thank you for allowing me to participate in the care of your patient.        Sincerely,        Tee Espinoza MD

## 2024-09-04 NOTE — PROGRESS NOTES
Chief Complaint: wound check  DATE OF SURGERY: 8/19/2024  POSTOPERATIVE DIAGNOSIS: Left quadricep tendon failure  PROCEDURE: Repair left quadricep tendon  SURGEON: Tee Espinoza MD     HPI: Delmis is a 74 year old female here for follow-up 2.5 weeks s/p above procedure.  She has a Left TKA.  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  She reports she is doing well, minimal discomfort.  She is wearing the brace locked in extension and is FWB.  She cannot drive because she cannot get in and out of her vehicle with a straight leg.  She has PT set up to start in 2 weeks.  No other concerns.    Physical Exam: Delmis is a 74 year old female who is alert and oriented and in no distress.  She has a mildly antalgic gait without gait assistance.  Hinged knee braced locked in extension and fitting appropriately.  Left knee incision healing well with no erythema or drainage.  Mild swelling.  Mild swelling of LLE with no calf tenderness.  Patient is able to do a quad set.  We did not have her do a straight leg raise today.  NV intact distally.    Impression: 74 year old female with left TKA s/p repair of recurrent quad tendon tear    Plan: Patient can shower and get the incision wet.  No soaking in a tub or pool for 2 weeks.  OK to use vitamin E oil or cocoa butter on the incisions.  Cover incisions as needed for comfort.  Continue to ice and elevate for swelling control.  Start to work with PT for range of motion and strengthening, as well as working on home exercises. In 1 week, progress to 30 degrees, and then every weeks after, progress 15-30 degrees, no forceful flexion.  I showed her how to unlock the brace. Follow-up in 6 weeks for check of progress.  Long-term post-op plan was reviewed below.  All questions were answered today.    Patient was also examined by Dr. Espinoza and he agrees with the plan of care.

## 2024-09-04 NOTE — NURSING NOTE
Reason For Visit:   Chief Complaint   Patient presents with    Surgical Followup     DOS 8/19/2024 Reconstruction Left Quadricep Tendon         There were no vitals taken for this visit.    Pain Assessment  Patient Currently in Pain: Yes  Patient's Stated Pain Goal: 2  0-10 Pain Scale: 2  Primary Pain Location: Knee (Patient has neuropathy and having issues with right foot)    Livier Mijares CMA

## 2024-09-04 NOTE — PROGRESS NOTES
I was present with the PA during the history and exam.  I discussed the case with the PA and agree with the findings as documented in the assessment and plan.   20.47

## 2024-09-05 ENCOUNTER — TELEPHONE (OUTPATIENT)
Dept: ORTHOPEDICS | Facility: CLINIC | Age: 74
End: 2024-09-05
Payer: COMMERCIAL

## 2024-09-05 NOTE — TELEPHONE ENCOUNTER
Left Voicemail (1st Attempt) and Sent Mychart (1st Attempt) for the patient to call back and schedule the following:    Appointment type: POST OP MSK  Provider:   Return date: 6 weeks

## 2024-09-09 ENCOUNTER — TELEPHONE (OUTPATIENT)
Dept: ORTHOPEDICS | Facility: CLINIC | Age: 74
End: 2024-09-09
Payer: COMMERCIAL

## 2024-09-09 NOTE — TELEPHONE ENCOUNTER
Left Voicemail (2nd Attempt) and Sent Mychart (2nd Attempt) for the patient to call back and schedule the following:    Appointment type: POST OP MSK  Provider:   Return date: 6 weeks from 9/5/24  MAX attempts made to schedule patient   no

## 2024-10-25 ENCOUNTER — OFFICE VISIT (OUTPATIENT)
Dept: ORTHOPEDICS | Facility: CLINIC | Age: 74
End: 2024-10-25
Payer: COMMERCIAL

## 2024-10-25 DIAGNOSIS — S76.112S QUADRICEPS TENDON RUPTURE, LEFT, SEQUELA: Primary | ICD-10-CM

## 2024-10-25 DIAGNOSIS — Z96.652 STATUS POST TOTAL LEFT KNEE REPLACEMENT: ICD-10-CM

## 2024-10-25 PROCEDURE — 99024 POSTOP FOLLOW-UP VISIT: CPT | Performed by: ORTHOPAEDIC SURGERY

## 2024-10-25 NOTE — LETTER
10/25/2024      Delmis Parrish  [address]  [place] [address]      Dear Colleague,    Thank you for referring your patient, Delmis Parrish, to the [facility] [place]. Please see a copy of my visit note below.    This patient had revision of her quadricep tendon repair about 9 weeks ago.  She has been very cautious and hesitant and apprehensive.  She still sleeps in her hinged knee brace which is only at 0-90 and is working with a physical therapist.    On examination she is in no acute distress in her usual mood and affect.  In the left lower extremity there is no erythema and she has a well-healed incision.  She is able to extend fully and can flex past 90 degrees in a seated position without much difficulty.    I opened up her hinged knee brace to full flexion and have encouraged her to discontinue it and we can stop wearing it at night.  I think she is making good progress but I am somewhat concerned about her becoming stiff if she does not utilize for full motion in the next weeks.  Our plan is to see her back in 1 year with x-rays of the knee for routine follow-up.  I have answered all of her questions today.      Again, thank you for allowing me to participate in the care of your patient.        Sincerely,        Tee Espinoza MD

## 2024-10-25 NOTE — NURSING NOTE
Reason For Visit:   Chief Complaint   Patient presents with    Surgical Followup     DOS 8/19/2024 Reconstruction Left Quadricep Tendon - Left       There were no vitals taken for this visit.    Pain Assessment  Patient Currently in Pain: Yes  Patient's Stated Pain Goal: 3  0-10 Pain Scale: 3  Primary Pain Location: Knee (Left)    Livier Mijares CMA

## 2024-10-25 NOTE — PROGRESS NOTES
This patient had revision of her quadricep tendon repair about 9 weeks ago.  She has been very cautious and hesitant and apprehensive.  She still sleeps in her hinged knee brace which is only at 0-90 and is working with a physical therapist.    On examination she is in no acute distress in her usual mood and affect.  In the left lower extremity there is no erythema and she has a well-healed incision.  She is able to extend fully and can flex past 90 degrees in a seated position without much difficulty.    I opened up her hinged knee brace to full flexion and have encouraged her to discontinue it and we can stop wearing it at night.  I think she is making good progress but I am somewhat concerned about her becoming stiff if she does not utilize for full motion in the next weeks.  Our plan is to see her back in 1 year with x-rays of the knee for routine follow-up.  I have answered all of her questions today.

## 2024-11-16 ENCOUNTER — HEALTH MAINTENANCE LETTER (OUTPATIENT)
Age: 74
End: 2024-11-16

## (undated) DEVICE — GOWN XLG DISP 9545

## (undated) DEVICE — DRSG STERI STRIP 1/2X4" R1547

## (undated) DEVICE — LINEN TOWEL PACK X5 5464

## (undated) DEVICE — DRSG DRAIN 4X4" 7086

## (undated) DEVICE — SU VICRYL 2-0 CT-1 27" UND J259H

## (undated) DEVICE — CAST PADDING 4" STERILE 9044S

## (undated) DEVICE — SOL NACL 0.9% IRRIG 1000ML BOTTLE 2F7124

## (undated) DEVICE — ESU GROUND PAD UNIVERSAL W/O CORD

## (undated) DEVICE — SPONGE LAP 18X18" X8435

## (undated) DEVICE — PREP DURAPREP 26ML APL 8630

## (undated) DEVICE — GLOVE BIOGEL PI MICRO SZ 7.0 48570

## (undated) DEVICE — DRAPE STOCKINETTE IMPERVIOUS 12" 1587

## (undated) DEVICE — BNDG ELASTIC 6" DBL LENGTH UNSTERILE 6611-16

## (undated) DEVICE — SU MONOCRYL 4-0 P-3 18" UND Y494G

## (undated) DEVICE — GLOVE PROTEXIS BLUE W/NEU-THERA 7.5  2D73EB75

## (undated) DEVICE — SU VICRYL+ 0 CT 36" DYED VCP358H

## (undated) DEVICE — PREP SKIN SCRUB TRAY 4461A

## (undated) DEVICE — DRSG AQUACEL AG HYDROFIBER  3.5X10" 422605

## (undated) DEVICE — PACK LOWER EXTREMITY RIVERSIDE SOP32LEFSX

## (undated) DEVICE — DRAPE IOBAN INCISE 23X17" 6650EZ

## (undated) DEVICE — TRAY PREP DRY SKIN SCRUB 067

## (undated) DEVICE — BLADE SAW SAGITTAL STRK 18X90X1.27MM HD SYS 6 6118-127-090

## (undated) DEVICE — LINEN ORTHO PACK 5446

## (undated) DEVICE — DRAPE U-DRAPE 1015NSD NON-STERILE

## (undated) DEVICE — DEVICE RETRIEVER HEWSON 71111579

## (undated) DEVICE — LINEN BACK PACK 5440

## (undated) DEVICE — STRAP KNEE/BODY 31143004

## (undated) DEVICE — SOL WATER IRRIG 1000ML BOTTLE 2F7114

## (undated) DEVICE — SUCTION MANIFOLD NEPTUNE 2 SYS 4 PORT 0702-020-000

## (undated) DEVICE — ESU PENCIL W/SMOKE EVAC NEPTUNE STRYKER 0703-046-000

## (undated) DEVICE — PREP CHLORAPREP 26ML TINTED HI-LITE ORANGE 930815

## (undated) DEVICE — TOURNIQUET CUFF 30" REPRO BLUE 60-7070-105

## (undated) DEVICE — PREP POVIDONE-IODINE 7.5% SCRUB 4OZ BOTTLE MDS093945

## (undated) DEVICE — DRAIN ROUND W/RESERV KIT JACKSON PRATT 10FR 400ML SU130-402D

## (undated) DEVICE — CAST PADDING 6" STERILE 9046S

## (undated) DEVICE — Device

## (undated) DEVICE — HOOD SURG T7PLUS PEEL AWAY FACE SHIELD STRL LF 0416-801-100

## (undated) DEVICE — SU VICRYL 1 CT-1 36" J347H

## (undated) DEVICE — SUCTION TIP YANKAUER STR K87

## (undated) DEVICE — SU MONOCRYL 4-0 PS-2 18" UND Y496G

## (undated) DEVICE — SU TICRON #5 HOS-14 30" 3027-79

## (undated) DEVICE — DRAPE IOBAN INCISE 13X13" 6640EZ

## (undated) DEVICE — DRSG TELFA 3X8" 1238

## (undated) DEVICE — DRAPE CONVERTORS U-DRAPE 60X72" 8476

## (undated) DEVICE — SU VICRYL 1 CT-1 36" UND J947H

## (undated) DEVICE — BRACE KNEE BREG ADJSTBL XL 17-24 X 35.5IN 08815

## (undated) DEVICE — SUCTION IRR SYSTEM W/O TIP INTERPULSE HANDPIECE 0210-100-000

## (undated) DEVICE — BONE CLEANING TIP INTERPULSE  0210-010-000

## (undated) DEVICE — BLADE KNIFE SURG 15 371115

## (undated) DEVICE — SU VICRYL 2-0 CT-2 27" UND J269H

## (undated) DEVICE — BONE CEMENT MIXEVAC III HI VAC KIT  0206-015-000

## (undated) DEVICE — GLOVE PROTEXIS W/NEU-THERA 7.0  2D73TE70

## (undated) DEVICE — SU VICRYL 0 CT-1 36" J946H

## (undated) DEVICE — SU FIBERWIRE 2 38"  AR-7200

## (undated) DEVICE — BONE WAX OSTENE 1.0GM BW-05

## (undated) DEVICE — BASIN SET MAJOR

## (undated) DEVICE — DRSG TEGADERM 4X4 3/4" 1626W

## (undated) DEVICE — SPECIMEN CONTAINER 5OZ STERILE 2600SA

## (undated) DEVICE — SOL NACL 0.9% IRRIG 3000ML BAG 2B7477

## (undated) DEVICE — DECANTER TRANSFER DEVICE 2008S

## (undated) RX ORDER — PROPOFOL 10 MG/ML
INJECTION, EMULSION INTRAVENOUS
Status: DISPENSED
Start: 2022-08-08

## (undated) RX ORDER — PROPOFOL 10 MG/ML
INJECTION, EMULSION INTRAVENOUS
Status: DISPENSED
Start: 2023-03-27

## (undated) RX ORDER — BUPIVACAINE HYDROCHLORIDE 2.5 MG/ML
INJECTION, SOLUTION INFILTRATION; PERINEURAL
Status: DISPENSED
Start: 2024-05-13

## (undated) RX ORDER — LIDOCAINE HYDROCHLORIDE 10 MG/ML
INJECTION, SOLUTION EPIDURAL; INFILTRATION; INTRACAUDAL; PERINEURAL
Status: DISPENSED
Start: 2022-07-15

## (undated) RX ORDER — ONDANSETRON 2 MG/ML
INJECTION INTRAMUSCULAR; INTRAVENOUS
Status: DISPENSED
Start: 2024-08-19

## (undated) RX ORDER — DEXAMETHASONE SODIUM PHOSPHATE 4 MG/ML
INJECTION, SOLUTION INTRA-ARTICULAR; INTRALESIONAL; INTRAMUSCULAR; INTRAVENOUS; SOFT TISSUE
Status: DISPENSED
Start: 2024-08-19

## (undated) RX ORDER — FENTANYL CITRATE 50 UG/ML
INJECTION, SOLUTION INTRAMUSCULAR; INTRAVENOUS
Status: DISPENSED
Start: 2024-08-19

## (undated) RX ORDER — OXYCODONE HYDROCHLORIDE 5 MG/1
TABLET ORAL
Status: DISPENSED
Start: 2024-08-19

## (undated) RX ORDER — CEFAZOLIN SODIUM/WATER 2 G/20 ML
SYRINGE (ML) INTRAVENOUS
Status: DISPENSED
Start: 2023-03-27

## (undated) RX ORDER — FENTANYL CITRATE 50 UG/ML
INJECTION, SOLUTION INTRAMUSCULAR; INTRAVENOUS
Status: DISPENSED
Start: 2024-05-13

## (undated) RX ORDER — LIDOCAINE HYDROCHLORIDE 20 MG/ML
INJECTION, SOLUTION EPIDURAL; INFILTRATION; INTRACAUDAL; PERINEURAL
Status: DISPENSED
Start: 2022-08-08

## (undated) RX ORDER — FENTANYL CITRATE 50 UG/ML
INJECTION, SOLUTION INTRAMUSCULAR; INTRAVENOUS
Status: DISPENSED
Start: 2022-08-08

## (undated) RX ORDER — HYDROMORPHONE HYDROCHLORIDE 1 MG/ML
INJECTION, SOLUTION INTRAMUSCULAR; INTRAVENOUS; SUBCUTANEOUS
Status: DISPENSED
Start: 2024-08-19

## (undated) RX ORDER — EPHEDRINE SULFATE 50 MG/ML
INJECTION, SOLUTION INTRAMUSCULAR; INTRAVENOUS; SUBCUTANEOUS
Status: DISPENSED
Start: 2023-03-27

## (undated) RX ORDER — FENTANYL CITRATE-0.9 % NACL/PF 10 MCG/ML
PLASTIC BAG, INJECTION (ML) INTRAVENOUS
Status: DISPENSED
Start: 2024-08-19

## (undated) RX ORDER — TRANEXAMIC ACID 650 MG/1
TABLET ORAL
Status: DISPENSED
Start: 2023-03-27

## (undated) RX ORDER — ONDANSETRON 2 MG/ML
INJECTION INTRAMUSCULAR; INTRAVENOUS
Status: DISPENSED
Start: 2022-08-08

## (undated) RX ORDER — FENTANYL CITRATE 50 UG/ML
INJECTION, SOLUTION INTRAMUSCULAR; INTRAVENOUS
Status: DISPENSED
Start: 2023-03-27

## (undated) RX ORDER — LIDOCAINE HYDROCHLORIDE 10 MG/ML
INJECTION, SOLUTION INFILTRATION; PERINEURAL
Status: DISPENSED
Start: 2022-10-12

## (undated) RX ORDER — ONDANSETRON 2 MG/ML
INJECTION INTRAMUSCULAR; INTRAVENOUS
Status: DISPENSED
Start: 2023-03-27

## (undated) RX ORDER — SODIUM CHLORIDE 9 MG/ML
INJECTION, SOLUTION INTRAVENOUS
Status: DISPENSED
Start: 2022-07-15

## (undated) RX ORDER — FENTANYL CITRATE-0.9 % NACL/PF 10 MCG/ML
PLASTIC BAG, INJECTION (ML) INTRAVENOUS
Status: DISPENSED
Start: 2024-05-13

## (undated) RX ORDER — ACETAMINOPHEN 325 MG/1
TABLET ORAL
Status: DISPENSED
Start: 2023-03-27

## (undated) RX ORDER — HYDROMORPHONE HYDROCHLORIDE 1 MG/ML
INJECTION, SOLUTION INTRAMUSCULAR; INTRAVENOUS; SUBCUTANEOUS
Status: DISPENSED
Start: 2023-03-27

## (undated) RX ORDER — FENTANYL CITRATE 50 UG/ML
INJECTION, SOLUTION INTRAMUSCULAR; INTRAVENOUS
Status: DISPENSED
Start: 2022-07-15

## (undated) RX ORDER — BUPIVACAINE HYDROCHLORIDE 2.5 MG/ML
INJECTION, SOLUTION EPIDURAL; INFILTRATION; INTRACAUDAL
Status: DISPENSED
Start: 2024-08-19

## (undated) RX ORDER — DEXAMETHASONE SODIUM PHOSPHATE 4 MG/ML
INJECTION, SOLUTION INTRA-ARTICULAR; INTRALESIONAL; INTRAMUSCULAR; INTRAVENOUS; SOFT TISSUE
Status: DISPENSED
Start: 2023-03-27

## (undated) RX ORDER — CEFAZOLIN SODIUM/WATER 2 G/20 ML
SYRINGE (ML) INTRAVENOUS
Status: DISPENSED
Start: 2024-08-19

## (undated) RX ORDER — BUPIVACAINE HYDROCHLORIDE 2.5 MG/ML
INJECTION, SOLUTION EPIDURAL; INFILTRATION; INTRACAUDAL
Status: DISPENSED
Start: 2022-08-08

## (undated) RX ORDER — ACETAMINOPHEN 325 MG/1
TABLET ORAL
Status: DISPENSED
Start: 2024-05-13

## (undated) RX ORDER — ACETAMINOPHEN 325 MG/1
TABLET ORAL
Status: DISPENSED
Start: 2024-08-19

## (undated) RX ORDER — HYDROMORPHONE HYDROCHLORIDE 1 MG/ML
INJECTION, SOLUTION INTRAMUSCULAR; INTRAVENOUS; SUBCUTANEOUS
Status: DISPENSED
Start: 2024-05-13

## (undated) RX ORDER — ACETAMINOPHEN 325 MG/1
TABLET ORAL
Status: DISPENSED
Start: 2022-08-08

## (undated) RX ORDER — OXYCODONE HYDROCHLORIDE 5 MG/1
TABLET ORAL
Status: DISPENSED
Start: 2024-05-13

## (undated) RX ORDER — CEFAZOLIN SODIUM/WATER 2 G/20 ML
SYRINGE (ML) INTRAVENOUS
Status: DISPENSED
Start: 2024-05-13

## (undated) RX ORDER — TRIAMCINOLONE ACETONIDE 40 MG/ML
INJECTION, SUSPENSION INTRA-ARTICULAR; INTRAMUSCULAR
Status: DISPENSED
Start: 2022-10-12

## (undated) RX ORDER — FENTANYL CITRATE-0.9 % NACL/PF 10 MCG/ML
PLASTIC BAG, INJECTION (ML) INTRAVENOUS
Status: DISPENSED
Start: 2023-03-27

## (undated) RX ORDER — CEFAZOLIN SODIUM/WATER 2 G/20 ML
SYRINGE (ML) INTRAVENOUS
Status: DISPENSED
Start: 2022-08-08

## (undated) RX ORDER — GABAPENTIN 100 MG/1
CAPSULE ORAL
Status: DISPENSED
Start: 2022-08-08

## (undated) RX ORDER — DEXAMETHASONE SODIUM PHOSPHATE 4 MG/ML
INJECTION, SOLUTION INTRA-ARTICULAR; INTRALESIONAL; INTRAMUSCULAR; INTRAVENOUS; SOFT TISSUE
Status: DISPENSED
Start: 2022-08-08